# Patient Record
Sex: FEMALE | Race: WHITE | NOT HISPANIC OR LATINO | Employment: FULL TIME | URBAN - METROPOLITAN AREA
[De-identification: names, ages, dates, MRNs, and addresses within clinical notes are randomized per-mention and may not be internally consistent; named-entity substitution may affect disease eponyms.]

---

## 2017-03-10 ENCOUNTER — OFFICE VISIT (OUTPATIENT)
Dept: UROLOGY | Facility: CLINIC | Age: 37
End: 2017-03-10
Payer: COMMERCIAL

## 2017-03-10 VITALS — HEIGHT: 64 IN | WEIGHT: 139.75 LBS | RESPIRATION RATE: 18 BRPM | BODY MASS INDEX: 23.86 KG/M2

## 2017-03-10 DIAGNOSIS — N20.0 KIDNEY STONE: ICD-10-CM

## 2017-03-10 DIAGNOSIS — N30.10 IC (INTERSTITIAL CYSTITIS): Primary | ICD-10-CM

## 2017-03-10 DIAGNOSIS — G89.29 RIGHT FLANK PAIN, CHRONIC: ICD-10-CM

## 2017-03-10 DIAGNOSIS — R39.89 BLADDER PAIN: ICD-10-CM

## 2017-03-10 DIAGNOSIS — R31.29 HEMATURIA, MICROSCOPIC: ICD-10-CM

## 2017-03-10 DIAGNOSIS — R10.9 RIGHT FLANK PAIN, CHRONIC: ICD-10-CM

## 2017-03-10 PROCEDURE — 99999 PR PBB SHADOW E&M-EST. PATIENT-LVL III: CPT | Mod: PBBFAC,,, | Performed by: UROLOGY

## 2017-03-10 PROCEDURE — 99215 OFFICE O/P EST HI 40 MIN: CPT | Mod: 25,S$GLB,, | Performed by: UROLOGY

## 2017-03-10 PROCEDURE — 88112 CYTOPATH CELL ENHANCE TECH: CPT | Performed by: PATHOLOGY

## 2017-03-10 PROCEDURE — 88112 CYTOPATH CELL ENHANCE TECH: CPT | Mod: 26,,, | Performed by: PATHOLOGY

## 2017-03-10 PROCEDURE — 95970 ALYS NPGT W/O PRGRMG: CPT | Mod: S$GLB,,, | Performed by: UROLOGY

## 2017-03-10 PROCEDURE — 1160F RVW MEDS BY RX/DR IN RCRD: CPT | Mod: S$GLB,,, | Performed by: UROLOGY

## 2017-03-10 RX ORDER — POTASSIUM CITRATE 10 MEQ/1
10 TABLET, EXTENDED RELEASE ORAL
Qty: 270 TABLET | Refills: 3 | Status: SHIPPED | OUTPATIENT
Start: 2017-03-10 | End: 2018-04-13 | Stop reason: SDUPTHER

## 2017-03-10 RX ORDER — DOXEPIN HYDROCHLORIDE 10 MG/1
CAPSULE ORAL
Refills: 0 | COMMUNITY
Start: 2017-02-28 | End: 2018-04-13

## 2017-03-10 RX ORDER — SUMATRIPTAN SUCCINATE 100 MG/1
100 TABLET ORAL
COMMUNITY
Start: 2016-10-25 | End: 2021-02-17 | Stop reason: CLARIF

## 2017-03-10 RX ORDER — GABAPENTIN 100 MG/1
CAPSULE ORAL
Refills: 0 | COMMUNITY
Start: 2017-02-22 | End: 2020-03-03

## 2017-03-10 NOTE — PROGRESS NOTES
CC: s/p InterStim, IC, chronic right flank pain    Tracy Tucker is a 36 y.o. woman who is here for the evaluation of IC (has daily right flank pain when she has to urinate bad. currently being treated for nerve pain in [place])  hx of IC and kidney stone disease.     Since Urocit K was given by me with an intention of alkalinization of urine, she feels a lot better with less bladder pain.  She is very pleased.  No associated gross hematuria or change in urination noted.    Sometimes stress definitely results in the right flank pain.  Has been followed by Dr. Perez in GI.     s/p InterStim Therapy on 7/30/14 for incomplete bladder emptying, frequency and urgency, bladder pain.  She is dong well with voiding symptoms.  She now knows that what food or stress can result in her flare-ups.  Has been on Flomax and hydroxyzine, but was able to wean them off.  Has a question about getting a MRI while she has the InterStim Devices.    Past Medical History:   Diagnosis Date    PONV (postoperative nausea and vomiting)     S/P Botox injection 2014    urethral     Past Surgical History:   Procedure Laterality Date    CHOLECYSTECTOMY      TUBAL LIGATION       Social History   Substance Use Topics    Smoking status: Never Smoker    Smokeless tobacco: None    Alcohol use No     History reviewed. No pertinent family history.  Allergy:  Review of patient's allergies indicates:  No Known Allergies  Outpatient Encounter Prescriptions as of 3/10/2017   Medication Sig Dispense Refill    dextroamphetamine-amphetamine (ADDERALL XR) 20 MG 24 hr capsule Take 20 mg by mouth every morning.      lidocaine-prilocaine (EMLA) cream       omeprazole (PRILOSEC) 20 MG capsule Take 20 mg by mouth once daily.  10    ondansetron (ZOFRAN ODT) 4 MG TbDL Take 4 mg by mouth.      potassium citrate (UROCIT-K) 10 mEq (1,080 mg) TbSR Take 1 tablet (10 mEq total) by mouth 3 (three) times daily with meals. 270 tablet 3    sumatriptan  (IMITREX) 100 MG tablet Take 100 mg by mouth.      verapamil (VERELAN PM) 200 mg 24 hr capsule Take 200 mg by mouth.      [DISCONTINUED] potassium citrate (UROCIT-K) 10 mEq (1,080 mg) TbSR Take 1 tablet (10 mEq total) by mouth 3 (three) times daily with meals. 270 tablet 3    doxepin (SINEQUAN) 10 MG capsule   0    gabapentin (NEURONTIN) 100 MG capsule take 1 to 3 capsules by mouth at bedtime for NERVE PAIN  0    sumatriptan (IMITREX) 100 MG tablet Take 100 mg by mouth.      [DISCONTINUED] dextroamphetamine-amphetamine 10 mg Tab Take by mouth.      [DISCONTINUED] hydrOXYzine HCl (ATARAX) 25 MG tablet Take 1 tablet (25 mg total) by mouth every evening. 90 tablet 3    [DISCONTINUED] phenazopyridine (PYRIDIUM) 200 MG tablet Take 1 tablet (200 mg total) by mouth 3 (three) times daily as needed for Pain. 100 tablet 0    [DISCONTINUED] propranolol (INDERAL LA) 120 MG 24 hr capsule Take 120 mg by mouth once daily.      [DISCONTINUED] tamsulosin (FLOMAX) 0.4 mg Cp24 Take 1 capsule (0.4 mg total) by mouth once daily. 90 capsule 3    [DISCONTINUED] temazepam (RESTORIL) 30 mg capsule Take 30 mg by mouth.       No facility-administered encounter medications on file as of 3/10/2017.      Review of Systems   General ROS: GENERAL:  No weight gain or loss  SKIN:  No rashes or lacerations  HEAD:  No headaches  EYES:  No exophthalmos, jaundice or blindness  EARS:  No dizziness, tinnitus or hearing loss  NOSE:  No changes in smell  MOUTH & THROAT:  No dyskinetic movements or obvious goiter  CHEST:  No shortness of breath, hyperventilation or cough  CARDIOVASCULAR:  No tachycardia or chest pain  ABDOMEN:  No nausea, vomiting, pain, constipation or diarrhea  URINARY:  No frequency, dysuria or sexual dysfunction  ENDOCRINE:  No polydipsia, polyuria  MUSCULOSKELETAL:  No pain or stiffness of the joints  NEUROLOGIC:  No weakness, sensory changes, seizures, confusion, memory loss, tremor or other abnormal movements  Physical  Exam     Vitals:    03/10/17 0951   Resp: 18     Physical Examination:   General appearance - alert, well appearing, and in no distress  Mental status - alert, oriented to person, place, and time  Eyes - pupils equal and reactive, extraocular eye movements intact  Ears - bilateral TM's and external ear canals normal  Nose - normal and patent, no erythema, discharge or polyps  Mouth - mucous membranes moist, pharynx normal without lesions  Neck - supple, no significant adenopathy  Chest - clear to auscultation, no wheezes, rales or rhonchi, symmetric air entry  Breast- no mass or lumps or tenderness  Heart - normal rate, regular rhythm, normal S1, S2, no murmurs, rubs, clicks or gallops  Abdomen - soft, nontender, nondistended, no masses or organomegaly of liver and spleen, no hernia noted.    Back exam - full range of motion, no tenderness, palpable spasm or pain on motion  LE - No edema noted.  Neurological - alert, oriented, normal speech, no focal findings or movement disorder noted  Musculoskeletal - no joint tenderness, deformity or swelling    LABS:  Lab Results   Component Value Date    CREATININE 0.7 08/18/2015     No results found for: LABURIN  Radiology:  CT 12/19/16   Punctate bilateral nonobstructing renal stones.    Procedure  Interstim checked.  It was turned off.  I turned it back on and she felt the stimulation properly.  Volt 0.8 volt.  Battery life 28 to 47 %, 26 to 44 months left.    Assessment and Plan:  April was seen today for ic.    Diagnoses and all orders for this visit:    IC (interstitial cystitis)    Bladder pain  -     potassium citrate (UROCIT-K) 10 mEq (1,080 mg) TbSR; Take 1 tablet (10 mEq total) by mouth 3 (three) times daily with meals.    Kidney stone  -     potassium citrate (UROCIT-K) 10 mEq (1,080 mg) TbSR; Take 1 tablet (10 mEq total) by mouth 3 (three) times daily with meals.    Right flank pain, chronic    continue IC smart diet.  Doing well on Urocit K for her kidney stones  as well as bladder irritation and pian.  Alkalization of urine definitely helped her.  Usage of baking soda prn discussed.    Regarding MRI, if it is absolutely needed, I asked pt that her doctor can order it a the [facility], and it can be done without problems related to InterStim devices.  I simply recommend that she should turn the InterStim voltage down to zero and turn the generator off.  After MRI, she can turn it back on again.  Or she can arrange the MRI thru my office if necessary.  Potential risks of getting MRI discussed and she understands and will discuss this matter with her doctor(s), especially evaluating her chronic right side pain.    I spent 40 minutes with the patient of which more than half was spent in direct consultation with the patient in regards to our treatment and plan.    Follow-up:  Return in about 1 year (around 3/10/2018).

## 2017-03-10 NOTE — MR AVS SNAPSHOT
[facility] - Urology 4th Floor  [address]  [facility] [address]  Phone: [phone]                  April Helen Tucker   3/10/2017 9:40 AM   Office Visit    Description:  Female : 1980   Provider:  Torres Medrano MD   Department:  [facility] - Urology 4th Floor           Reason for Visit     IC           Diagnoses this Visit        Comments    IC (interstitial cystitis)    -  Primary     Bladder pain         Kidney stone         Right flank pain, chronic                To Do List           Goals (5 Years of Data)     None      Follow-Up and Disposition     Return in about 1 year (around 3/10/2018).       These Medications        Disp Refills Start End    potassium citrate (UROCIT-K) 10 mEq (1,080 mg) TbSR 270 tablet 3 3/10/2017 3/10/2018    Take 1 tablet (10 mEq total) by mouth 3 (three) times daily with meals. - Oral    Pharmacy: [facility] - [address] #: [phone]         [facility] On Call     [facility] On Call Nurse Care Line -  Assistance  Registered nurses in the [facility] provide clinical advisement, health education, appointment booking, and other advisory services.  Call for this free service at [phone].             Medications           Message regarding Medications     Verify the changes and/or additions to your medication regime listed below are the same as discussed with your clinician today.  If any of these changes or additions are incorrect, please notify your healthcare provider.        STOP taking these medications     dextroamphetamine-amphetamine 10 mg Tab Take by mouth.    hydrOXYzine HCl (ATARAX) 25 MG tablet Take 1 tablet (25 mg total) by mouth every evening.    phenazopyridine (PYRIDIUM) 200 MG tablet Take 1 tablet (200 mg total) by mouth 3 (three) times daily as needed for Pain.    tamsulosin (FLOMAX) 0.4 mg Cp24 Take 1 capsule (0.4 mg total) by mouth once daily.    temazepam (RESTORIL) 30 mg capsule  "Take 30 mg by mouth.    propranolol (INDERAL LA) 120 MG 24 hr capsule Take 120 mg by mouth once daily.           Verify that the below list of medications is an accurate representation of the medications you are currently taking.  If none reported, the list may be blank. If incorrect, please contact your healthcare provider. Carry this list with you in case of emergency.           Current Medications     dextroamphetamine-amphetamine (ADDERALL XR) 20 MG 24 hr capsule Take 20 mg by mouth every morning.    lidocaine-prilocaine (EMLA) cream     omeprazole (PRILOSEC) 20 MG capsule Take 20 mg by mouth once daily.    ondansetron (ZOFRAN ODT) 4 MG TbDL Take 4 mg by mouth.    potassium citrate (UROCIT-K) 10 mEq (1,080 mg) TbSR Take 1 tablet (10 mEq total) by mouth 3 (three) times daily with meals.    sumatriptan (IMITREX) 100 MG tablet Take 100 mg by mouth.    verapamil (VERELAN PM) 200 mg 24 hr capsule Take 200 mg by mouth.    doxepin (SINEQUAN) 10 MG capsule     gabapentin (NEURONTIN) 100 MG capsule take 1 to 3 capsules by mouth at bedtime for NERVE PAIN           Clinical Reference Information           Your Vitals Were     Resp Height Weight Last Period BMI    18 5' 4" (1.626 m) 63.4 kg (139 lb 12.4 oz) 06/19/2014 23.99 kg/m2      Allergies as of 3/10/2017     No Known Allergies      Immunizations Administered on Date of Encounter - 3/10/2017     None      Language Assistance Services     ATTENTION: Language assistance services are available, free of charge. Please call [phone].      ATENCIÓN: Si fabiola thompson, tiene a carter disposición servicios gratuitos de asistencia lingüística. Llame al [phone].     [facility] Ý: N?u b?n nói Ti?ng Vi?t, có các d?ch v? h? tr? ngôn ng? mi?n phí dành cho b?n. G?i s? [phone].         [facility] complies with applicable Federal civil rights laws and does not discriminate on the basis of race, color, national origin, age, disability, or sex.        "

## 2018-04-13 ENCOUNTER — OFFICE VISIT (OUTPATIENT)
Dept: UROLOGY | Facility: CLINIC | Age: 38
End: 2018-04-13
Payer: COMMERCIAL

## 2018-04-13 VITALS
HEART RATE: 100 BPM | SYSTOLIC BLOOD PRESSURE: 117 MMHG | BODY MASS INDEX: 24.84 KG/M2 | HEIGHT: 64 IN | DIASTOLIC BLOOD PRESSURE: 66 MMHG | WEIGHT: 145.5 LBS

## 2018-04-13 DIAGNOSIS — N20.0 KIDNEY STONE: ICD-10-CM

## 2018-04-13 DIAGNOSIS — R39.89 BLADDER PAIN: Primary | ICD-10-CM

## 2018-04-13 DIAGNOSIS — N30.10 IC (INTERSTITIAL CYSTITIS): ICD-10-CM

## 2018-04-13 PROCEDURE — 99999 PR PBB SHADOW E&M-EST. PATIENT-LVL III: CPT | Mod: PBBFAC,,, | Performed by: UROLOGY

## 2018-04-13 PROCEDURE — 99214 OFFICE O/P EST MOD 30 MIN: CPT | Mod: S$GLB,,, | Performed by: UROLOGY

## 2018-04-13 RX ORDER — POTASSIUM CITRATE 10 MEQ/1
10 TABLET, EXTENDED RELEASE ORAL
Qty: 270 TABLET | Refills: 3 | Status: SHIPPED | OUTPATIENT
Start: 2018-04-13 | End: 2019-04-06 | Stop reason: SDUPTHER

## 2018-04-13 RX ORDER — LISDEXAMFETAMINE DIMESYLATE 40 MG/1
CAPSULE ORAL
COMMUNITY
Start: 2018-04-11 | End: 2020-03-03

## 2018-04-13 NOTE — PROGRESS NOTES
CC: s/p InterStim, IC, chronic right flank pain    Tracy Tucker is a 38 y.o. woman who is here for the evaluation of ic(interstitial cystitis) (annual check up she not complaining about no urology issue doing well .)  hx of IC and kidney stone disease.  She is really doing well.  Was noted to have back problem and has been taking Neurotin which really helped her back pain.    In addition, she noticed that since she has taken Potassium citrate to alkalinize her urine, her bladder pain has been better controlled.  She only had a couple of flare up episodes for the past year.     Since Urocit K was given by me with an intention of alkalinization of urine, she feels a lot better with less bladder pain.  She is very pleased.  No associated gross hematuria or change in urination noted.    Sometimes stress definitely results in the right flank pain.  Has been followed by Dr. Perez in GI.     s/p InterStim Therapy on 7/30/14 for incomplete bladder emptying, frequency and urgency, bladder pain.  She is dong well with voiding symptoms.  She now knows that what food or stress can result in her flare-ups.  Has been on Flomax and hydroxyzine, but was able to wean them off.  Has a question about getting a MRI while she has the InterStim Devices.    Past Medical History:   Diagnosis Date    PONV (postoperative nausea and vomiting)     S/P Botox injection 2014    urethral     Past Surgical History:   Procedure Laterality Date    CHOLECYSTECTOMY      TUBAL LIGATION       Social History   Substance Use Topics    Smoking status: Never Smoker    Smokeless tobacco: Not on file    Alcohol use No     No family history on file.  Allergy:  Review of patient's allergies indicates:  No Known Allergies  Outpatient Encounter Prescriptions as of 4/13/2018   Medication Sig Dispense Refill    gabapentin (NEURONTIN) 100 MG capsule take 1 to 3 capsules by mouth at bedtime for NERVE PAIN  0    lidocaine-prilocaine (EMLA) cream        omeprazole (PRILOSEC) 20 MG capsule Take 20 mg by mouth once daily.  10    potassium citrate (UROCIT-K) 10 mEq (1,080 mg) TbSR Take 1 tablet (10 mEq total) by mouth 3 (three) times daily with meals. 270 tablet 3    sumatriptan (IMITREX) 100 MG tablet Take 100 mg by mouth.      VYVANSE 40 mg Cap       [DISCONTINUED] dextroamphetamine-amphetamine (ADDERALL XR) 20 MG 24 hr capsule Take 20 mg by mouth every morning.      [DISCONTINUED] doxepin (SINEQUAN) 10 MG capsule   0    [DISCONTINUED] potassium citrate (UROCIT-K) 10 mEq (1,080 mg) TbSR Take 1 tablet (10 mEq total) by mouth 3 (three) times daily with meals. 270 tablet 3     No facility-administered encounter medications on file as of 4/13/2018.      Review of Systems   General ROS: GENERAL:  No weight gain or loss  SKIN:  No rashes or lacerations  HEAD:  No headaches  EYES:  No exophthalmos, jaundice or blindness  EARS:  No dizziness, tinnitus or hearing loss  NOSE:  No changes in smell  MOUTH & THROAT:  No dyskinetic movements or obvious goiter  CHEST:  No shortness of breath, hyperventilation or cough  CARDIOVASCULAR:  No tachycardia or chest pain  ABDOMEN:  No nausea, vomiting, pain, constipation or diarrhea  URINARY:  No frequency, dysuria or sexual dysfunction  ENDOCRINE:  No polydipsia, polyuria  MUSCULOSKELETAL:  No pain or stiffness of the joints  NEUROLOGIC:  No weakness, sensory changes, seizures, confusion, memory loss, tremor or other abnormal movements  Physical Exam     Vitals:    04/13/18 0932   BP: 117/66   Pulse: 100     Physical Examination:   General appearance - alert, well appearing, and in no distress  Mental status - alert, oriented to person, place, and time  Eyes - pupils equal and reactive, extraocular eye movements intact  Ears - bilateral TM's and external ear canals normal  Nose - normal and patent, no erythema, discharge or polyps  Mouth - mucous membranes moist, pharynx normal without lesions  Neck - supple, no significant  adenopathy  Chest - clear to auscultation, no wheezes, rales or rhonchi, symmetric air entry  Breast- no mass or lumps or tenderness  Heart - normal rate, regular rhythm, normal S1, S2, no murmurs, rubs, clicks or gallops  Abdomen - soft, nontender, nondistended, no masses or organomegaly of liver and spleen, no hernia noted.    Back exam - full range of motion, no tenderness, palpable spasm or pain on motion  LE - No edema noted.  Neurological - alert, oriented, normal speech, no focal findings or movement disorder noted  Musculoskeletal - no joint tenderness, deformity or swelling    LABS:  Lab Results   Component Value Date    CREATININE 0.7 08/18/2015     No results found for: LABURIN  Radiology:  CT 12/19/16   Punctate bilateral nonobstructing renal stones.    Procedure  Interstim checked.  It was turned off.  I turned it back on and she felt the stimulation properly.  Volt 0.8 volt.  Battery life 28 to 47 %, 26 to 44 months left.    Assessment and Plan:  April was seen today for ic(interstitial cystitis).    Diagnoses and all orders for this visit:    Bladder pain  -     potassium citrate (UROCIT-K) 10 mEq (1,080 mg) TbSR; Take 1 tablet (10 mEq total) by mouth 3 (three) times daily with meals.    IC (interstitial cystitis)    Kidney stone  -     potassium citrate (UROCIT-K) 10 mEq (1,080 mg) TbSR; Take 1 tablet (10 mEq total) by mouth 3 (three) times daily with meals.    continue IC smart diet.  Doing well on Urocit K for her kidney stones as well as bladder irritation and pian.  Alkalization of urine definitely helped her.  Evamor or Alkaline water    Regarding MRI, I gave her the MRI guideline in pts with InterStim Therapy.  Potential risks of getting MRI discussed and she understands and will discuss this matter with her doctor(s), especially evaluating her chronic right side pain.    I spent 25 minutes with the patient of which more than half was spent in direct consultation with the patient in regards to  our treatment and plan.    Follow-up:  Follow-up in about 1 year (around 4/13/2019).

## 2018-08-22 ENCOUNTER — TELEPHONE (OUTPATIENT)
Dept: UROLOGY | Facility: CLINIC | Age: 38
End: 2018-08-22

## 2018-08-22 NOTE — TELEPHONE ENCOUNTER
----- Message from Sarai Lindsay LPN sent at 8/22/2018 12:35 PM CDT -----  Contact: pt#[phone]  Please advise    ----- Message -----  From: Shasta Rg  Sent: 8/22/2018  11:33 AM  To: [facility] Staff    Needs Advice    Reason for call:    Pt states that she's having a IC flare up and she wants to know if Dr Medrano would send in a Rx   Communication Preference:callback   Additional Information:[facility] #[phone]

## 2019-04-06 DIAGNOSIS — R39.89 BLADDER PAIN: ICD-10-CM

## 2019-04-06 DIAGNOSIS — N20.0 KIDNEY STONE: ICD-10-CM

## 2019-04-08 RX ORDER — POTASSIUM CITRATE 10 MEQ/1
TABLET, EXTENDED RELEASE ORAL
Qty: 300 TABLET | Refills: 3 | Status: SHIPPED | OUTPATIENT
Start: 2019-04-08 | End: 2020-03-03

## 2019-08-06 ENCOUNTER — OFFICE VISIT (OUTPATIENT)
Dept: UROLOGY | Facility: CLINIC | Age: 39
End: 2019-08-06
Payer: COMMERCIAL

## 2019-08-06 VITALS
SYSTOLIC BLOOD PRESSURE: 119 MMHG | HEART RATE: 83 BPM | WEIGHT: 141.13 LBS | HEIGHT: 64 IN | BODY MASS INDEX: 24.1 KG/M2 | DIASTOLIC BLOOD PRESSURE: 68 MMHG

## 2019-08-06 DIAGNOSIS — N20.0 KIDNEY STONES, CALCIUM OXALATE: Primary | ICD-10-CM

## 2019-08-06 PROCEDURE — 99215 OFFICE O/P EST HI 40 MIN: CPT | Mod: S$GLB,,, | Performed by: UROLOGY

## 2019-08-06 PROCEDURE — 99999 PR PBB SHADOW E&M-EST. PATIENT-LVL III: ICD-10-PCS | Mod: PBBFAC,,, | Performed by: UROLOGY

## 2019-08-06 PROCEDURE — 99999 PR PBB SHADOW E&M-EST. PATIENT-LVL III: CPT | Mod: PBBFAC,,, | Performed by: UROLOGY

## 2019-08-06 PROCEDURE — 99215 PR OFFICE/OUTPT VISIT, EST, LEVL V, 40-54 MIN: ICD-10-PCS | Mod: S$GLB,,, | Performed by: UROLOGY

## 2019-08-06 PROCEDURE — 3008F PR BODY MASS INDEX (BMI) DOCUMENTED: ICD-10-PCS | Mod: CPTII,S$GLB,, | Performed by: UROLOGY

## 2019-08-06 PROCEDURE — 3008F BODY MASS INDEX DOCD: CPT | Mod: CPTII,S$GLB,, | Performed by: UROLOGY

## 2019-08-06 RX ORDER — TAMSULOSIN HYDROCHLORIDE 0.4 MG/1
0.4 CAPSULE ORAL NIGHTLY
Qty: 30 CAPSULE | Refills: 1 | Status: SHIPPED | OUTPATIENT
Start: 2019-08-06 | End: 2020-03-03

## 2019-08-06 RX ORDER — LANOLIN ALCOHOL/MO/W.PET/CERES
400 CREAM (GRAM) TOPICAL DAILY
Qty: 90 TABLET | Refills: 3 | Status: SHIPPED | OUTPATIENT
Start: 2019-08-06 | End: 2019-08-29 | Stop reason: SDUPTHER

## 2019-08-06 RX ORDER — POTASSIUM CITRATE 10 MEQ/1
20 TABLET, EXTENDED RELEASE ORAL 2 TIMES DAILY WITH MEALS
Qty: 360 TABLET | Refills: 3 | Status: SHIPPED | OUTPATIENT
Start: 2019-08-06 | End: 2020-03-03 | Stop reason: SDUPTHER

## 2019-08-06 RX ORDER — TIZANIDINE 4 MG/1
4 TABLET ORAL NIGHTLY
COMMUNITY
End: 2020-03-03

## 2019-08-06 NOTE — PROGRESS NOTES
CC: left flank pain, hematuria, left UVJ stone    Tracy Tucker is a 39 y.o. woman who is here for the evaluation of Nephrolithiasis  went to the emergency department on 8/4/19 with complaints of having left flank pain consistent with prior kidney stone pain. Patient started couple hours ago and is gotten significantly worse. Patient is never required stents she usually can pass the stones on her own. She had some mild dysuria prior to arrival. Patient denies headache, neck pain, slurred speech, facial droop, chest pain, chest pressure, chest tightness, shortness of breath, fevers, chills night sweats, vomiting, diarrhea.    CT obtained outside ER on 8/4/19 showed Mild left hydronephrosis and left hydroureter with a 1 mm distal left ureteral stone Bilateral renal calculi with increased overall stones burden in the left kidney     She had a problem with persistent pain then complete resolved after her ER visit.  She did not collect the stone but she might have passed the stone.  No more hematuria noted.    hx of IC and kidney stone disease.  She is really doing well.  Was noted to have back problem and has been taking Neurotin which really helped her back pain.    In addition, she noticed that since she has taken Potassium citrate to alkalinize her urine, her bladder pain has been better controlled.  She only had a couple of flare up episodes for the past year.     Since Urocit K was given by me with an intention of alkalinization of urine, she feels a lot better with less bladder pain.  She is very pleased.  No associated gross hematuria or change in urination noted.    Sometimes stress definitely results in the right flank pain.  Has been followed by Dr. Perez in GI.     s/p InterStim Therapy on 7/30/14 for incomplete bladder emptying, frequency and urgency, bladder pain.  She is dong well with voiding symptoms.  She now knows that what food or stress can result in her flare-ups.  Has been on Flomax and  hydroxyzine, but was able to wean them off.  Has a question about getting a MRI while she has the InterStim Devices.    Past Medical History:   Diagnosis Date    PONV (postoperative nausea and vomiting)     S/P Botox injection 2014    urethral     Past Surgical History:   Procedure Laterality Date    CHOLECYSTECTOMY      IMPLANT-INTERSTIM-PERCUTANEOUS-I AND II/cpt codes 62387 and 00559 N/A 7/30/2014    Performed by Torres Medrano MD at [facility] OR [address] [place] [address]    TUBAL LIGATION       Social History     Tobacco Use    Smoking status: Never Smoker    Smokeless tobacco: Never Used   Substance Use Topics    Alcohol use: No    Drug use: No     No family history on file.  Allergy:  Review of patient's allergies indicates:  No Known Allergies  Outpatient Encounter Medications as of 8/6/2019   Medication Sig Dispense Refill    gabapentin (NEURONTIN) 100 MG capsule take 1 to 3 capsules by mouth at bedtime for NERVE PAIN  0    potassium citrate (UROCIT-K) 10 mEq (1,080 mg) TbSR TAKE 1 TABLET BY MOUTH 3  TIMES DAILY WITH MEALS 300 tablet 3    tiZANidine (ZANAFLEX) 4 MG tablet Take 4 mg by mouth nightly.      VYVANSE 40 mg Cap       lidocaine-prilocaine (EMLA) cream       magnesium oxide (MAG-OX) 400 mg (241.3 mg magnesium) tablet Take 1 tablet (400 mg total) by mouth once daily. 90 tablet 3    omeprazole (PRILOSEC) 20 MG capsule Take 20 mg by mouth once daily.  10    potassium citrate (UROCIT-K) 10 mEq (1,080 mg) TbSR Take 2 tablets (20 mEq total) by mouth 2 (two) times daily with meals. 360 tablet 3    sumatriptan (IMITREX) 100 MG tablet Take 100 mg by mouth.      tamsulosin (FLOMAX) 0.4 mg Cap Take 1 capsule (0.4 mg total) by mouth every evening. 30 capsule 1     No facility-administered encounter medications on file as of 8/6/2019.      Review of Systems   General ROS: GENERAL:  No weight gain or loss  SKIN:  No rashes or lacerations  HEAD:  No headaches  EYES:  No exophthalmos, jaundice or blindness  EARS:  No  dizziness, tinnitus or hearing loss  NOSE:  No changes in smell  MOUTH & THROAT:  No dyskinetic movements or obvious goiter  CHEST:  No shortness of breath, hyperventilation or cough  CARDIOVASCULAR:  No tachycardia or chest pain  ABDOMEN:  No nausea, vomiting, pain, constipation or diarrhea  URINARY:  No frequency, dysuria or sexual dysfunction  ENDOCRINE:  No polydipsia, polyuria  MUSCULOSKELETAL:  No pain or stiffness of the joints  NEUROLOGIC:  No weakness, sensory changes, seizures, confusion, memory loss, tremor or other abnormal movements  Physical Exam     Vitals:    08/06/19 1400   BP: 119/68   Pulse: 83     Physical Examination:   General appearance - alert, well appearing, and in no distress  Mental status - alert, oriented to person, place, and time  Eyes - pupils equal and reactive, extraocular eye movements intact  Ears - bilateral TM's and external ear canals normal  Nose - normal and patent, no erythema, discharge or polyps  Mouth - mucous membranes moist, pharynx normal without lesions  Neck - supple, no significant adenopathy  Chest - clear to auscultation, no wheezes, rales or rhonchi, symmetric air entry  Breast- no mass or lumps or tenderness  Heart - normal rate, regular rhythm, normal S1, S2, no murmurs, rubs, clicks or gallops  Abdomen - soft, nontender, nondistended, no masses or organomegaly of liver and spleen, no hernia noted.    Back exam - full range of motion, no tenderness, palpable spasm or pain on motion  LE - No edema noted.  Neurological - alert, oriented, normal speech, no focal findings or movement disorder noted  Musculoskeletal - no joint tenderness, deformity or swelling    LABS:  Lab Results   Component Value Date    CREATININE 0.7 08/18/2015     No results found for: LABURIN  Radiology:  CT 12/19/16   Punctate bilateral nonobstructing renal stones.    Ct Stone Protocol Abdomen And Pelvis    Result Date: 8/4/2019  Mild left hydronephrosis and left hydroureter with a 1 mm  distal left ureteral stone Bilateral renal calculi with increased overall stones burden in the left kidney Diverticulosis Enlarged left adnexal mass. Recommend further assessment with pelvic ultrasound.      Assessment and Plan:  April was seen today for nephrolithiasis.    Diagnoses and all orders for this visit:    Kidney stones, calcium oxalate  -     magnesium oxide (MAG-OX) 400 mg (241.3 mg magnesium) tablet; Take 1 tablet (400 mg total) by mouth once daily.  -     tamsulosin (FLOMAX) 0.4 mg Cap; Take 1 capsule (0.4 mg total) by mouth every evening.  -     X-Ray Abdomen AP 1 View; Future  -     CT Renal Stone Study ABD Pelvis WO; Future  -     Basic metabolic panel; Future    Other orders  -     potassium citrate (UROCIT-K) 10 mEq (1,080 mg) TbSR; Take 2 tablets (20 mEq total) by mouth 2 (two) times daily with meals.    I reviewed her CT reports.  Bilateral kidney stones burden is sill 1 to 2 mm in size.  Will maximize medical therapy.  Increase her Urocit K to 20 m Eq BID.  Magnesium oxide.  Low oxalate diet.  Will follow up with CT RSS, KUB and BMP in 6 months.  OK to use flomax prn or especially if she has a stone attack.    S/p InterStim Therapy  continue IC smart diet.  Alkalization of urine definitely helped her.  Evamor or Alkaline water  I spent 40 minutes with the patient of which more than half was spent in direct consultation with the patient in regards to our treatment and plan.    Follow-up:  Follow up in about 6 months (around 2/6/2020), or KUB, CT RSS, BMP.

## 2019-08-22 DIAGNOSIS — N20.0 KIDNEY STONES, CALCIUM OXALATE: ICD-10-CM

## 2019-08-22 RX ORDER — SYRING-NEEDL,DISP,INSUL,0.3 ML 29 G X1/2"
296 SYRINGE, EMPTY DISPOSABLE MISCELLANEOUS ONCE
Refills: 0 | COMMUNITY
Start: 2019-08-22 | End: 2019-08-22

## 2019-08-22 RX ORDER — LANOLIN ALCOHOL/MO/W.PET/CERES
400 CREAM (GRAM) TOPICAL DAILY
Qty: 90 TABLET | Refills: 3 | Status: CANCELLED | OUTPATIENT
Start: 2019-08-22

## 2019-08-22 NOTE — TELEPHONE ENCOUNTER
----- Message from Barbara Durand MA sent at 8/22/2019  9:51 AM CDT -----  Contact: [phone]  Needs Advice    Reason for call: pt states that she needs the magnesium citrate Rx re-sent to [facility] even though it's OTC it still has to go through them as a Rx from Dr Medrano        Communication Preference: [phone]    Additional Information:    [facility] - [address] [phone] (Phone)  [phone] (Fax)

## 2019-08-29 DIAGNOSIS — N20.0 KIDNEY STONES, CALCIUM OXALATE: ICD-10-CM

## 2019-08-29 RX ORDER — LANOLIN ALCOHOL/MO/W.PET/CERES
400 CREAM (GRAM) TOPICAL DAILY
Qty: 90 TABLET | Refills: 3 | Status: SHIPPED | OUTPATIENT
Start: 2019-08-29 | End: 2020-03-03 | Stop reason: SDUPTHER

## 2019-08-29 NOTE — TELEPHONE ENCOUNTER
----- Message from Nicolette Ray LPN sent at 8/29/2019 10:44 AM CDT -----  Contact: pt       ----- Message -----  From: Sachi Ramirez  Sent: 8/29/2019  10:10 AM  To: [facility] Staff    Type:  Needs Medical Advice    Who Called:  Pt called stated that [facility] faxed over a request for a Rx for pt magnesium oxide (MAG-OX) 400 mg (241.3 mg magnesium) tablet. Can you let pt know the status of the Rx. Or  Send [facility] a Rx so they can fill it.    Pharmacy name and phone #:    [facility] - [address]  [address] [place] [address]  Phone: [phone] Fax: [phone]    Would the patient rather a call back or a response via [facility]? Call pt at   Best Call Back Number: [phone]  Additional Information:

## 2020-03-03 ENCOUNTER — HOSPITAL ENCOUNTER (OUTPATIENT)
Dept: RADIOLOGY | Facility: HOSPITAL | Age: 40
Discharge: HOME | End: 2020-03-03
Attending: UROLOGY
Payer: COMMERCIAL

## 2020-03-03 ENCOUNTER — OFFICE VISIT (OUTPATIENT)
Dept: UROLOGY | Facility: CLINIC | Age: 40
End: 2020-03-03
Payer: COMMERCIAL

## 2020-03-03 VITALS
SYSTOLIC BLOOD PRESSURE: 115 MMHG | HEART RATE: 92 BPM | HEIGHT: 64 IN | DIASTOLIC BLOOD PRESSURE: 70 MMHG | BODY MASS INDEX: 25.25 KG/M2 | WEIGHT: 147.94 LBS

## 2020-03-03 DIAGNOSIS — N20.0 KIDNEY STONES, CALCIUM OXALATE: ICD-10-CM

## 2020-03-03 DIAGNOSIS — N30.10 IC (INTERSTITIAL CYSTITIS): ICD-10-CM

## 2020-03-03 DIAGNOSIS — N20.0 KIDNEY STONE: Primary | ICD-10-CM

## 2020-03-03 DIAGNOSIS — N20.0 KIDNEY STONE ON LEFT SIDE: ICD-10-CM

## 2020-03-03 DIAGNOSIS — N83.202 UNSPECIFIED OVARIAN CYST, LEFT SIDE: ICD-10-CM

## 2020-03-03 PROCEDURE — 74176 CT ABD & PELVIS W/O CONTRAST: CPT | Mod: 26,,, | Performed by: RADIOLOGY

## 2020-03-03 PROCEDURE — 74018 RADEX ABDOMEN 1 VIEW: CPT | Mod: TC

## 2020-03-03 PROCEDURE — 74176 CT RENAL STONE STUDY ABD PELVIS WO: ICD-10-PCS | Mod: 26,,, | Performed by: RADIOLOGY

## 2020-03-03 PROCEDURE — 3008F PR BODY MASS INDEX (BMI) DOCUMENTED: ICD-10-PCS | Mod: CPTII,S$GLB,, | Performed by: UROLOGY

## 2020-03-03 PROCEDURE — 74018 RADEX ABDOMEN 1 VIEW: CPT | Mod: 26,,, | Performed by: RADIOLOGY

## 2020-03-03 PROCEDURE — 3008F BODY MASS INDEX DOCD: CPT | Mod: CPTII,S$GLB,, | Performed by: UROLOGY

## 2020-03-03 PROCEDURE — 99999 PR PBB SHADOW E&M-EST. PATIENT-LVL III: ICD-10-PCS | Mod: PBBFAC,,, | Performed by: UROLOGY

## 2020-03-03 PROCEDURE — 99999 PR PBB SHADOW E&M-EST. PATIENT-LVL III: CPT | Mod: PBBFAC,,, | Performed by: UROLOGY

## 2020-03-03 PROCEDURE — 82365 CALCULUS SPECTROSCOPY: CPT

## 2020-03-03 PROCEDURE — 99215 OFFICE O/P EST HI 40 MIN: CPT | Mod: S$GLB,,, | Performed by: UROLOGY

## 2020-03-03 PROCEDURE — 74176 CT ABD & PELVIS W/O CONTRAST: CPT | Mod: TC

## 2020-03-03 PROCEDURE — 74018 XR ABDOMEN AP 1 VIEW: ICD-10-PCS | Mod: 26,,, | Performed by: RADIOLOGY

## 2020-03-03 PROCEDURE — 99215 PR OFFICE/OUTPT VISIT, EST, LEVL V, 40-54 MIN: ICD-10-PCS | Mod: S$GLB,,, | Performed by: UROLOGY

## 2020-03-03 RX ORDER — ESCITALOPRAM OXALATE 5 MG/1
TABLET ORAL
COMMUNITY
Start: 2019-12-13

## 2020-03-03 RX ORDER — ZOLPIDEM TARTRATE 5 MG/1
TABLET ORAL
COMMUNITY

## 2020-03-03 RX ORDER — POTASSIUM CITRATE 10 MEQ/1
20 TABLET, EXTENDED RELEASE ORAL 2 TIMES DAILY WITH MEALS
Qty: 360 TABLET | Refills: 3 | Status: SHIPPED | OUTPATIENT
Start: 2020-03-03 | End: 2021-02-08 | Stop reason: SDUPTHER

## 2020-03-03 RX ORDER — MELOXICAM 7.5 MG/1
TABLET ORAL
COMMUNITY
Start: 2019-12-09 | End: 2021-05-24

## 2020-03-03 RX ORDER — TAMSULOSIN HYDROCHLORIDE 0.4 MG/1
0.4 CAPSULE ORAL
COMMUNITY
Start: 2019-12-07 | End: 2021-02-08 | Stop reason: SDUPTHER

## 2020-03-03 RX ORDER — ORPHENADRINE CITRATE 100 MG/1
TABLET, EXTENDED RELEASE ORAL
COMMUNITY
Start: 2020-02-19 | End: 2021-02-17 | Stop reason: CLARIF

## 2020-03-03 RX ORDER — AMITRIPTYLINE HYDROCHLORIDE 10 MG/1
10 TABLET, FILM COATED ORAL NIGHTLY
Qty: 90 TABLET | Refills: 3 | Status: SHIPPED | OUTPATIENT
Start: 2020-03-03 | End: 2021-02-08

## 2020-03-03 RX ORDER — AMITRIPTYLINE HYDROCHLORIDE 10 MG/1
TABLET, FILM COATED ORAL
COMMUNITY
Start: 2019-12-09 | End: 2020-03-03 | Stop reason: SDUPTHER

## 2020-03-03 RX ORDER — LANOLIN ALCOHOL/MO/W.PET/CERES
400 CREAM (GRAM) TOPICAL DAILY
Qty: 90 TABLET | Refills: 3 | Status: SHIPPED | OUTPATIENT
Start: 2020-03-03 | End: 2021-02-08 | Stop reason: SDUPTHER

## 2020-03-03 NOTE — PROGRESS NOTES
CC: follow up left flank pain, hematuria, left UVJ stone    Tracy Tucker is a 39 y.o. woman who is here for the Follow-up    She is here today with CT RSS and KUB.  So far she no longer has a problem with left flank pain.  C/o occasional pressure over the bladder area.  Denies any hematuria or dysuria.  Has been on mag oxide and urocit K for kidney stone disease.  Was on flomax to facilitate stone passage.    went to the emergency department on 8/4/19 with complaints of having left flank pain consistent with prior kidney stone pain. Patient started couple hours ago and is gotten significantly worse. Patient is never required stents she usually can pass the stones on her own. She had some mild dysuria prior to arrival. Patient denies headache, neck pain, slurred speech, facial droop, chest pain, chest pressure, chest tightness, shortness of breath, fevers, chills night sweats, vomiting, diarrhea.  CT obtained outside ER on 8/4/19 showed Mild left hydronephrosis and left hydroureter with a 1 mm distal left ureteral stone Bilateral renal calculi with increased overall stones burden in the left kidney   She did not collect the stone but she might have passed the stone.  No more hematuria noted.    hx of IC and kidney stone disease.  She is really doing well.  Was noted to have back problem and has been taking Neurotin which really helped her back pain.    In addition, she noticed that since she has taken Potassium citrate to alkalinize her urine, her bladder pain has been better controlled.  She only had a couple of flare up episodes for the past year.     Since Urocit K was given by me with an intention of alkalinization of urine, she feels a lot better with less bladder pain.  She is very pleased.  No associated gross hematuria or change in urination noted.    Sometimes stress definitely results in the right flank pain.  Has been followed by Dr. Perez in GI.     s/p InterStim Therapy on 7/30/14 for incomplete  bladder emptying, frequency and urgency, bladder pain.  She is dong well with voiding symptoms.  She now knows that what food or stress can result in her flare-ups.  Has been on Flomax and hydroxyzine, but was able to wean them off.  Has a question about getting a MRI while she has the InterStim Devices.    Past Medical History:   Diagnosis Date    PONV (postoperative nausea and vomiting)     S/P Botox injection 2014    urethral     Past Surgical History:   Procedure Laterality Date    CHOLECYSTECTOMY      TUBAL LIGATION       Social History     Tobacco Use    Smoking status: Never Smoker    Smokeless tobacco: Never Used   Substance Use Topics    Alcohol use: No    Drug use: No     History reviewed. No pertinent family history.  Allergy:  Review of patient's allergies indicates:  No Known Allergies  Outpatient Encounter Medications as of 3/3/2020   Medication Sig Dispense Refill    amitriptyline (ELAVIL) 10 MG tablet Take 1 tablet (10 mg total) by mouth every evening. 90 tablet 3    escitalopram oxalate (LEXAPRO) 5 MG Tab       lidocaine-prilocaine (EMLA) cream       magnesium oxide (MAG-OX) 400 mg (241.3 mg magnesium) tablet Take 1 tablet (400 mg total) by mouth once daily. 90 tablet 3    meloxicam (MOBIC) 7.5 MG tablet       orphenadrine (NORFLEX) 100 mg tablet       potassium citrate (UROCIT-K) 10 mEq (1,080 mg) TbSR Take 2 tablets (20 mEq total) by mouth 2 (two) times daily with meals. 360 tablet 3    tamsulosin (FLOMAX) 0.4 mg Cap Take 0.4 mg by mouth.      zolpidem (AMBIEN) 5 MG Tab       [DISCONTINUED] amitriptyline (ELAVIL) 10 MG tablet       [DISCONTINUED] magnesium oxide (MAG-OX) 400 mg (241.3 mg magnesium) tablet Take 1 tablet (400 mg total) by mouth once daily. 90 tablet 3    [DISCONTINUED] potassium citrate (UROCIT-K) 10 mEq (1,080 mg) TbSR Take 2 tablets (20 mEq total) by mouth 2 (two) times daily with meals. 360 tablet 3    sumatriptan (IMITREX) 100 MG tablet Take 100 mg by mouth.       [DISCONTINUED] gabapentin (NEURONTIN) 100 MG capsule take 1 to 3 capsules by mouth at bedtime for NERVE PAIN  0    [DISCONTINUED] omeprazole (PRILOSEC) 20 MG capsule Take 20 mg by mouth once daily.  10    [DISCONTINUED] potassium citrate (UROCIT-K) 10 mEq (1,080 mg) TbSR TAKE 1 TABLET BY MOUTH 3  TIMES DAILY WITH MEALS 300 tablet 3    [DISCONTINUED] tamsulosin (FLOMAX) 0.4 mg Cap Take 1 capsule (0.4 mg total) by mouth every evening. 30 capsule 1    [DISCONTINUED] tiZANidine (ZANAFLEX) 4 MG tablet Take 4 mg by mouth nightly.      [DISCONTINUED] VYVANSE 40 mg Cap        No facility-administered encounter medications on file as of 3/3/2020.      Review of Systems   General ROS: GENERAL:  No weight gain or loss  SKIN:  No rashes or lacerations  HEAD:  No headaches  EYES:  No exophthalmos, jaundice or blindness  EARS:  No dizziness, tinnitus or hearing loss  NOSE:  No changes in smell  MOUTH & THROAT:  No dyskinetic movements or obvious goiter  CHEST:  No shortness of breath, hyperventilation or cough  CARDIOVASCULAR:  No tachycardia or chest pain  ABDOMEN:  No nausea, vomiting, pain, constipation or diarrhea  URINARY:  No frequency, dysuria or sexual dysfunction  ENDOCRINE:  No polydipsia, polyuria  MUSCULOSKELETAL:  No pain or stiffness of the joints  NEUROLOGIC:  No weakness, sensory changes, seizures, confusion, memory loss, tremor or other abnormal movements  Physical Exam     Vitals:    03/03/20 1300   BP: 115/70   Pulse: 92     Physical Examination:   General appearance - alert, well appearing, and in no distress  Mental status - alert, oriented to person, place, and time  Eyes - pupils equal and reactive, extraocular eye movements intact  Ears - bilateral TM's and external ear canals normal  Nose - normal and patent, no erythema, discharge or polyps  Mouth - mucous membranes moist, pharynx normal without lesions  Neck - supple, no significant adenopathy  Chest - clear to auscultation, no wheezes, rales  or rhonchi, symmetric air entry  Breast- no mass or lumps or tenderness  Heart - normal rate, regular rhythm, normal S1, S2, no murmurs, rubs, clicks or gallops  Abdomen - soft, nontender, nondistended, no masses or organomegaly of liver and spleen, no hernia noted.    Back exam - full range of motion, no tenderness, palpable spasm or pain on motion  LE - No edema noted.  Neurological - alert, oriented, normal speech, no focal findings or movement disorder noted  Musculoskeletal - no joint tenderness, deformity or swelling    LABS:  Lab Results   Component Value Date    CREATININE 0.7 08/18/2015     No results found for: LABURIN  Radiology:  CT 12/19/16   Punctate bilateral nonobstructing renal stones.    CT on 3/3/20  Decreased stone burden relative to the prior exam of 12/19/2016, with only a solitary punctate nonobstructing stone in the left upper pole renal collecting system.  No evidence of obstructive uropathy.  Prior cholecystectomy and hysterectomy.  Prominent left ovary measuring up to 6.5 cm.  This likely relates to underlying follicles or cyst.  Clinical correlation advised with pelvic ultrasound if warranted.    Programming of InterStim therapy  0.75 volts  Battery life greater 28 months   Impendence check all good.    Assessment and Plan:  April was seen today for follow-up.    Diagnoses and all orders for this visit:    Kidney stone  -     Urinary Stone Analysis    IC (interstitial cystitis)  -     amitriptyline (ELAVIL) 10 MG tablet; Take 1 tablet (10 mg total) by mouth every evening.    Unspecified ovarian cyst, left side    Kidney stone on left side  -     potassium citrate (UROCIT-K) 10 mEq (1,080 mg) TbSR; Take 2 tablets (20 mEq total) by mouth 2 (two) times daily with meals.    Kidney stones, calcium oxalate  -     magnesium oxide (MAG-OX) 400 mg (241.3 mg magnesium) tablet; Take 1 tablet (400 mg total) by mouth once daily.    I reviewed her CT reports.  Bilateral kidney stones burden seen before  1 to 2 mm in size are reduced to only a single stone on the left upper pole collecting system.  continue medical therapy Urocit K to 20 m Eq BID and Magnesium oxide.  Low oxalate diet.    OK to use flomax prn or especially if she has a stone attack.    Recommend to follow up with her ob regarding left ovary cyst?.    S/p InterStim Therapy, more than 28 months battery life left.  continue IC smart diet.  Drink plenty of water.  I spent 40 minutes with the patient of which more than half was spent in direct consultation with the patient in regards to our treatment and plan.    Follow-up:  Follow up in about 1 year (around 3/3/2021).

## 2020-03-03 NOTE — LETTER
March 3, 2020      Montrell Harman MD  [address] [place] [address]           [facility] U[facility] 4th Floor  [address]  [facility] [address]  Phone: [phone]          Patient: Tracy Tucker   MR Number: [id number]   YOB: 1980   Date of Visit: 3/3/2020       Dear Dr. Montrell Harman:    Thank you for referring Tracy Tucker to me for evaluation. Attached you will find relevant portions of my assessment and plan of care.    If you have questions, please do not hesitate to call me. I look forward to following Tracy Tucker along with you.    Sincerely,    Torres Medrano MD    Enclosure  CC:  No Recipients    If you would like to receive this communication electronically, please contact [email or web address] or [phone] to request more information on [facility] [facility] Link access.    For providers and/or their staff who would like to refer a patient to [facility], please contact us through our one-stop-shop provider referral line, [facility], at [phone].    If you feel you have received this communication in error or would no longer like to receive these types of communications, please e-mail [email or web address]

## 2020-03-06 LAB
COMPN STONE: NORMAL
SPECIMEN SOURCE: NORMAL
STONE ANALYSIS IR-IMP: NORMAL

## 2020-11-13 DIAGNOSIS — M54.50 LOW BACK ACHE: Primary | ICD-10-CM

## 2020-12-09 ENCOUNTER — HOSPITAL ENCOUNTER (OUTPATIENT)
Dept: RADIOLOGY | Facility: HOSPITAL | Age: 40
Discharge: HOME | End: 2020-12-09
Attending: ORTHOPAEDIC SURGERY
Payer: COMMERCIAL

## 2020-12-09 DIAGNOSIS — M54.50 LOW BACK ACHE: ICD-10-CM

## 2020-12-10 ENCOUNTER — TELEPHONE (OUTPATIENT)
Dept: UROLOGY | Facility: CLINIC | Age: 40
End: 2020-12-10

## 2020-12-10 NOTE — TELEPHONE ENCOUNTER
Pt has had interstim since 2014  She was advised that she would need it removed or replaced with a new mri compatible device. Pt will think about her options and call back on monday

## 2020-12-10 NOTE — TELEPHONE ENCOUNTER
----- Message from Seema Garcia LPN sent at 12/9/2020 10:42 AM CST -----  Pt showed in lobby for someone to help her turn off interstim for MRI of spine (not MRI compatible). Did not have her I-con remote and was told it was not compatible for latest MRI machine. States she called a week ago to find out if she could have MRI as previously told by Dr. Medrano. Would like a call tomorrow when you return to discuss as she came 2 hours away to have MRI done.

## 2021-02-08 ENCOUNTER — HOSPITAL ENCOUNTER (OUTPATIENT)
Dept: RADIOLOGY | Facility: HOSPITAL | Age: 41
Discharge: HOME | End: 2021-02-08
Attending: UROLOGY
Payer: COMMERCIAL

## 2021-02-08 ENCOUNTER — OFFICE VISIT (OUTPATIENT)
Dept: UROLOGY | Facility: CLINIC | Age: 41
End: 2021-02-08
Payer: COMMERCIAL

## 2021-02-08 VITALS
HEIGHT: 64 IN | SYSTOLIC BLOOD PRESSURE: 131 MMHG | DIASTOLIC BLOOD PRESSURE: 77 MMHG | WEIGHT: 163.81 LBS | HEART RATE: 116 BPM | BODY MASS INDEX: 27.96 KG/M2

## 2021-02-08 DIAGNOSIS — N20.0 KIDNEY STONE: ICD-10-CM

## 2021-02-08 DIAGNOSIS — N31.8 FREQUENCY-URGENCY SYNDROME: ICD-10-CM

## 2021-02-08 DIAGNOSIS — R33.9 INCOMPLETE BLADDER EMPTYING: ICD-10-CM

## 2021-02-08 DIAGNOSIS — T85.113A MECHANICAL BREAKDOWN OF GENERATOR OF IMPLANTED ELECTRONIC NEUROSTIMULATOR, INITIAL ENCOUNTER: ICD-10-CM

## 2021-02-08 DIAGNOSIS — N20.0 KIDNEY STONES, CALCIUM OXALATE: Primary | ICD-10-CM

## 2021-02-08 DIAGNOSIS — N20.0 KIDNEY STONE ON LEFT SIDE: ICD-10-CM

## 2021-02-08 DIAGNOSIS — R10.9 RIGHT FLANK PAIN: ICD-10-CM

## 2021-02-08 DIAGNOSIS — Z53.09 MRI CONTRAINDICATED DUE TO METAL IMPLANT: ICD-10-CM

## 2021-02-08 PROCEDURE — 74018 RADEX ABDOMEN 1 VIEW: CPT | Mod: TC,PO

## 2021-02-08 PROCEDURE — 3008F BODY MASS INDEX DOCD: CPT | Mod: CPTII,S$GLB,, | Performed by: UROLOGY

## 2021-02-08 PROCEDURE — 1126F PR PAIN SEVERITY QUANTIFIED, NO PAIN PRESENT: ICD-10-PCS | Mod: S$GLB,,, | Performed by: UROLOGY

## 2021-02-08 PROCEDURE — 95970 ALYS NPGT W/O PRGRMG: CPT | Mod: S$GLB,,, | Performed by: UROLOGY

## 2021-02-08 PROCEDURE — 99999 PR PBB SHADOW E&M-EST. PATIENT-LVL IV: CPT | Mod: PBBFAC,,, | Performed by: UROLOGY

## 2021-02-08 PROCEDURE — 1126F AMNT PAIN NOTED NONE PRSNT: CPT | Mod: S$GLB,,, | Performed by: UROLOGY

## 2021-02-08 PROCEDURE — 99215 PR OFFICE/OUTPT VISIT, EST, LEVL V, 40-54 MIN: ICD-10-PCS | Mod: 25,57,S$GLB, | Performed by: UROLOGY

## 2021-02-08 PROCEDURE — 99215 OFFICE O/P EST HI 40 MIN: CPT | Mod: 25,57,S$GLB, | Performed by: UROLOGY

## 2021-02-08 PROCEDURE — 3008F PR BODY MASS INDEX (BMI) DOCUMENTED: ICD-10-PCS | Mod: CPTII,S$GLB,, | Performed by: UROLOGY

## 2021-02-08 PROCEDURE — 95970 PR ANALYZE NEUROSTIM,NO REPROG: ICD-10-PCS | Mod: S$GLB,,, | Performed by: UROLOGY

## 2021-02-08 PROCEDURE — 74018 XR ABDOMEN AP 1 VIEW: ICD-10-PCS | Mod: 26,,, | Performed by: RADIOLOGY

## 2021-02-08 PROCEDURE — 74018 RADEX ABDOMEN 1 VIEW: CPT | Mod: 26,,, | Performed by: RADIOLOGY

## 2021-02-08 PROCEDURE — 99999 PR PBB SHADOW E&M-EST. PATIENT-LVL IV: ICD-10-PCS | Mod: PBBFAC,,, | Performed by: UROLOGY

## 2021-02-08 RX ORDER — GABAPENTIN 300 MG/1
300 CAPSULE ORAL 3 TIMES DAILY
COMMUNITY
End: 2023-01-23

## 2021-02-08 RX ORDER — TAMSULOSIN HYDROCHLORIDE 0.4 MG/1
0.4 CAPSULE ORAL DAILY
Qty: 90 CAPSULE | Refills: 3 | Status: SHIPPED | OUTPATIENT
Start: 2021-02-08 | End: 2021-02-26 | Stop reason: SDUPTHER

## 2021-02-08 RX ORDER — LANOLIN ALCOHOL/MO/W.PET/CERES
400 CREAM (GRAM) TOPICAL DAILY
Qty: 90 TABLET | Refills: 3 | Status: SHIPPED | OUTPATIENT
Start: 2021-02-08

## 2021-02-08 RX ORDER — POTASSIUM CITRATE 10 MEQ/1
20 TABLET, EXTENDED RELEASE ORAL 2 TIMES DAILY WITH MEALS
Qty: 360 TABLET | Refills: 3 | Status: SHIPPED | OUTPATIENT
Start: 2021-02-08 | End: 2021-02-26 | Stop reason: SDUPTHER

## 2021-02-08 RX ORDER — DEXTROAMPHETAMINE SULFATE, DEXTROAMPHETAMINE SACCHARATE, AMPHETAMINE SULFATE AND AMPHETAMINE ASPARTATE 5; 5; 5; 5 MG/1; MG/1; MG/1; MG/1
CAPSULE, EXTENDED RELEASE ORAL
COMMUNITY
Start: 2020-12-10 | End: 2021-02-17 | Stop reason: CLARIF

## 2021-02-09 ENCOUNTER — TELEPHONE (OUTPATIENT)
Dept: UROLOGY | Facility: CLINIC | Age: 41
End: 2021-02-09

## 2021-02-09 DIAGNOSIS — R33.9 INCOMPLETE BLADDER EMPTYING: ICD-10-CM

## 2021-02-09 DIAGNOSIS — N32.81 OAB (OVERACTIVE BLADDER): ICD-10-CM

## 2021-02-09 DIAGNOSIS — Z53.09 MRI CONTRAINDICATED DUE TO METAL IMPLANT: Primary | ICD-10-CM

## 2021-02-17 RX ORDER — DEXTROAMPHETAMINE SACCHARATE, AMPHETAMINE ASPARTATE, DEXTROAMPHETAMINE SULFATE AND AMPHETAMINE SULFATE 5; 5; 5; 5 MG/1; MG/1; MG/1; MG/1
1 TABLET ORAL DAILY
COMMUNITY

## 2021-02-21 ENCOUNTER — LAB VISIT (OUTPATIENT)
Dept: URGENT CARE | Facility: CLINIC | Age: 41
End: 2021-02-21
Payer: COMMERCIAL

## 2021-02-21 VITALS
DIASTOLIC BLOOD PRESSURE: 69 MMHG | WEIGHT: 163 LBS | SYSTOLIC BLOOD PRESSURE: 101 MMHG | TEMPERATURE: 99 F | HEART RATE: 76 BPM | HEIGHT: 64 IN | RESPIRATION RATE: 18 BRPM | BODY MASS INDEX: 27.83 KG/M2 | OXYGEN SATURATION: 99 %

## 2021-02-21 DIAGNOSIS — Z11.52 ENCOUNTER FOR SCREENING FOR COVID-19: Primary | ICD-10-CM

## 2021-02-21 LAB — SARS-COV-2 AG RESP QL IA.RAPID: NEGATIVE

## 2021-02-21 PROCEDURE — 87426 SARSCOV CORONAVIRUS AG IA: CPT | Mod: QW,S$GLB,, | Performed by: NURSE PRACTITIONER

## 2021-02-21 PROCEDURE — 99203 OFFICE O/P NEW LOW 30 MIN: CPT | Mod: S$GLB,,, | Performed by: EMERGENCY MEDICINE

## 2021-02-21 PROCEDURE — 99203 PR OFFICE/OUTPT VISIT, NEW, LEVL III, 30-44 MIN: ICD-10-PCS | Mod: S$GLB,,, | Performed by: EMERGENCY MEDICINE

## 2021-02-21 PROCEDURE — 87426 SARS CORONAVIRUS 2 ANTIGEN POCT: ICD-10-PCS | Mod: QW,S$GLB,, | Performed by: NURSE PRACTITIONER

## 2021-02-23 ENCOUNTER — TELEPHONE (OUTPATIENT)
Dept: UROLOGY | Facility: CLINIC | Age: 41
End: 2021-02-23

## 2021-02-24 ENCOUNTER — ANESTHESIA (OUTPATIENT)
Dept: SURGERY | Facility: HOSPITAL | Age: 41
End: 2021-02-24
Payer: COMMERCIAL

## 2021-02-24 ENCOUNTER — HOSPITAL ENCOUNTER (OUTPATIENT)
Facility: HOSPITAL | Age: 41
Discharge: HOME | End: 2021-02-24
Attending: UROLOGY | Admitting: UROLOGY
Payer: COMMERCIAL

## 2021-02-24 ENCOUNTER — ANESTHESIA EVENT (OUTPATIENT)
Dept: SURGERY | Facility: HOSPITAL | Age: 41
End: 2021-02-24
Payer: COMMERCIAL

## 2021-02-24 VITALS
TEMPERATURE: 99 F | BODY MASS INDEX: 27.83 KG/M2 | DIASTOLIC BLOOD PRESSURE: 47 MMHG | OXYGEN SATURATION: 100 % | WEIGHT: 163 LBS | HEIGHT: 64 IN | SYSTOLIC BLOOD PRESSURE: 109 MMHG | RESPIRATION RATE: 15 BRPM | HEART RATE: 77 BPM

## 2021-02-24 DIAGNOSIS — R39.15 URINARY URGENCY: Primary | ICD-10-CM

## 2021-02-24 DIAGNOSIS — N31.8 FREQUENCY-URGENCY SYNDROME: ICD-10-CM

## 2021-02-24 LAB — SARS-COV-2 RDRP RESP QL NAA+PROBE: NEGATIVE

## 2021-02-24 PROCEDURE — 37000008 HC ANESTHESIA 1ST 15 MINUTES: Performed by: UROLOGY

## 2021-02-24 PROCEDURE — 99900035 HC TECH TIME PER 15 MIN (STAT)

## 2021-02-24 PROCEDURE — 95971 ALYS SMPL SP/PN NPGT W/PRGRM: CPT | Mod: 59,,, | Performed by: UROLOGY

## 2021-02-24 PROCEDURE — D9220A PRA ANESTHESIA: ICD-10-PCS | Mod: ,,, | Performed by: ANESTHESIOLOGY

## 2021-02-24 PROCEDURE — C1897 LEAD, NEUROSTIM TEST KIT: HCPCS | Performed by: UROLOGY

## 2021-02-24 PROCEDURE — 36000706: Performed by: UROLOGY

## 2021-02-24 PROCEDURE — C1820 GENERATOR NEURO RECHG BAT SY: HCPCS | Performed by: UROLOGY

## 2021-02-24 PROCEDURE — 36000707: Performed by: UROLOGY

## 2021-02-24 PROCEDURE — D9220A PRA ANESTHESIA: ICD-10-PCS | Mod: ,,, | Performed by: NURSE ANESTHETIST, CERTIFIED REGISTERED

## 2021-02-24 PROCEDURE — 64590 PR IMPLANT PERIPH/GASTRIC NEUROSTIM/RECEIVER: ICD-10-PCS | Mod: 51,,, | Performed by: UROLOGY

## 2021-02-24 PROCEDURE — 25000003 PHARM REV CODE 250: Performed by: ANESTHESIOLOGY

## 2021-02-24 PROCEDURE — C1778 LEAD, NEUROSTIMULATOR: HCPCS | Performed by: UROLOGY

## 2021-02-24 PROCEDURE — 76000 FLUOROSCOPY <1 HR PHYS/QHP: CPT | Mod: 26,59,, | Performed by: UROLOGY

## 2021-02-24 PROCEDURE — 71000015 HC POSTOP RECOV 1ST HR: Performed by: UROLOGY

## 2021-02-24 PROCEDURE — 27000221 HC OXYGEN, UP TO 24 HOURS

## 2021-02-24 PROCEDURE — 71000033 HC RECOVERY, INTIAL HOUR: Performed by: UROLOGY

## 2021-02-24 PROCEDURE — 25000003 PHARM REV CODE 250: Performed by: STUDENT IN AN ORGANIZED HEALTH CARE EDUCATION/TRAINING PROGRAM

## 2021-02-24 PROCEDURE — 37000009 HC ANESTHESIA EA ADD 15 MINS: Performed by: UROLOGY

## 2021-02-24 PROCEDURE — 63600175 PHARM REV CODE 636 W HCPCS: Performed by: NURSE ANESTHETIST, CERTIFIED REGISTERED

## 2021-02-24 PROCEDURE — 95971 PR ANALYZE NEUROSTIM,SIMPLE/PROG: ICD-10-PCS | Mod: 59,,, | Performed by: UROLOGY

## 2021-02-24 PROCEDURE — 88300 PR  SURG PATH,GROSS,LEVEL I: ICD-10-PCS | Mod: 26,,, | Performed by: PATHOLOGY

## 2021-02-24 PROCEDURE — 88300 SURGICAL PATH GROSS: CPT | Mod: 26,,, | Performed by: PATHOLOGY

## 2021-02-24 PROCEDURE — 94761 N-INVAS EAR/PLS OXIMETRY MLT: CPT

## 2021-02-24 PROCEDURE — 63600175 PHARM REV CODE 636 W HCPCS: Performed by: STUDENT IN AN ORGANIZED HEALTH CARE EDUCATION/TRAINING PROGRAM

## 2021-02-24 PROCEDURE — D9220A PRA ANESTHESIA: Mod: ,,, | Performed by: ANESTHESIOLOGY

## 2021-02-24 PROCEDURE — 25000003 PHARM REV CODE 250: Performed by: UROLOGY

## 2021-02-24 PROCEDURE — U0002 COVID-19 LAB TEST NON-CDC: HCPCS

## 2021-02-24 PROCEDURE — 64581 OPN IMPLTJ NEA SACRAL NERVE: CPT | Mod: ,,, | Performed by: UROLOGY

## 2021-02-24 PROCEDURE — 25000003 PHARM REV CODE 250: Performed by: NURSE ANESTHETIST, CERTIFIED REGISTERED

## 2021-02-24 PROCEDURE — 88300 SURGICAL PATH GROSS: CPT | Performed by: PATHOLOGY

## 2021-02-24 PROCEDURE — 64581 PR IMPLANTATION, NEUROSTIM ELECT ARRAY, OPEN, SACRAL NERVE: ICD-10-PCS | Mod: ,,, | Performed by: UROLOGY

## 2021-02-24 PROCEDURE — 76000 PR  FLUOROSCOPE EXAMINATION: ICD-10-PCS | Mod: 26,59,, | Performed by: UROLOGY

## 2021-02-24 PROCEDURE — D9220A PRA ANESTHESIA: Mod: ,,, | Performed by: NURSE ANESTHETIST, CERTIFIED REGISTERED

## 2021-02-24 PROCEDURE — 64590 INS/RPL PRPH SAC/GSTR NPG/R: CPT | Mod: 51,,, | Performed by: UROLOGY

## 2021-02-24 DEVICE — KIT AXONICS LEAD TINED: Type: IMPLANTABLE DEVICE | Site: BACK | Status: FUNCTIONAL

## 2021-02-24 DEVICE — KIT AXONICS LEAD IMPLANT TINED: Type: IMPLANTABLE DEVICE | Site: BACK | Status: FUNCTIONAL

## 2021-02-24 DEVICE — NEUROSTIMULATOR AXONICS: Type: IMPLANTABLE DEVICE | Site: BACK | Status: FUNCTIONAL

## 2021-02-24 RX ORDER — SCOLOPAMINE TRANSDERMAL SYSTEM 1 MG/1
1 PATCH, EXTENDED RELEASE TRANSDERMAL ONCE
Status: DISCONTINUED | OUTPATIENT
Start: 2021-02-24 | End: 2021-02-24 | Stop reason: HOSPADM

## 2021-02-24 RX ORDER — LIDOCAINE HYDROCHLORIDE 10 MG/ML
1 INJECTION, SOLUTION EPIDURAL; INFILTRATION; INTRACAUDAL; PERINEURAL ONCE
Status: COMPLETED | OUTPATIENT
Start: 2021-02-24 | End: 2021-02-24

## 2021-02-24 RX ORDER — DOXYCYCLINE HYCLATE 100 MG
100 TABLET ORAL 2 TIMES DAILY
Qty: 10 TABLET | Refills: 0 | Status: SHIPPED | OUTPATIENT
Start: 2021-02-24 | End: 2021-03-01

## 2021-02-24 RX ORDER — LIDOCAINE HYDROCHLORIDE AND EPINEPHRINE 20; 10 MG/ML; UG/ML
INJECTION, SOLUTION INFILTRATION; PERINEURAL
Status: DISCONTINUED | OUTPATIENT
Start: 2021-02-24 | End: 2021-02-24 | Stop reason: HOSPADM

## 2021-02-24 RX ORDER — DEXMEDETOMIDINE HYDROCHLORIDE 100 UG/ML
INJECTION, SOLUTION INTRAVENOUS
Status: DISCONTINUED | OUTPATIENT
Start: 2021-02-24 | End: 2021-02-24

## 2021-02-24 RX ORDER — SODIUM CHLORIDE 0.9 % (FLUSH) 0.9 %
10 SYRINGE (ML) INJECTION
Status: DISCONTINUED | OUTPATIENT
Start: 2021-02-24 | End: 2021-02-24 | Stop reason: HOSPADM

## 2021-02-24 RX ORDER — ONDANSETRON 2 MG/ML
INJECTION INTRAMUSCULAR; INTRAVENOUS
Status: DISCONTINUED | OUTPATIENT
Start: 2021-02-24 | End: 2021-02-24

## 2021-02-24 RX ORDER — LIDOCAINE HYDROCHLORIDE 20 MG/ML
INJECTION INTRAVENOUS
Status: DISCONTINUED | OUTPATIENT
Start: 2021-02-24 | End: 2021-02-24

## 2021-02-24 RX ORDER — SODIUM BICARBONATE 1 MEQ/ML
SYRINGE (ML) INTRAVENOUS
Status: DISCONTINUED | OUTPATIENT
Start: 2021-02-24 | End: 2021-02-24 | Stop reason: HOSPADM

## 2021-02-24 RX ORDER — GENTAMICIN SULFATE 100 MG/100ML
240 INJECTION, SOLUTION INTRAVENOUS
Status: COMPLETED | OUTPATIENT
Start: 2021-02-24 | End: 2021-02-24

## 2021-02-24 RX ORDER — FENTANYL CITRATE 50 UG/ML
25 INJECTION, SOLUTION INTRAMUSCULAR; INTRAVENOUS EVERY 5 MIN PRN
Status: DISCONTINUED | OUTPATIENT
Start: 2021-02-24 | End: 2021-02-24 | Stop reason: HOSPADM

## 2021-02-24 RX ORDER — SODIUM CHLORIDE 9 MG/ML
INJECTION, SOLUTION INTRAVENOUS CONTINUOUS
Status: DISCONTINUED | OUTPATIENT
Start: 2021-02-24 | End: 2021-02-24 | Stop reason: HOSPADM

## 2021-02-24 RX ORDER — PROPOFOL 10 MG/ML
VIAL (ML) INTRAVENOUS
Status: DISCONTINUED | OUTPATIENT
Start: 2021-02-24 | End: 2021-02-24

## 2021-02-24 RX ORDER — VANCOMYCIN HCL IN 5 % DEXTROSE 1G/250ML
15 PLASTIC BAG, INJECTION (ML) INTRAVENOUS
Status: COMPLETED | OUTPATIENT
Start: 2021-02-24 | End: 2021-02-24

## 2021-02-24 RX ORDER — HYDROCODONE BITARTRATE AND ACETAMINOPHEN 5; 325 MG/1; MG/1
1 TABLET ORAL EVERY 6 HOURS PRN
Qty: 5 TABLET | Refills: 0 | Status: SHIPPED | OUTPATIENT
Start: 2021-02-24

## 2021-02-24 RX ORDER — FENTANYL CITRATE 50 UG/ML
INJECTION, SOLUTION INTRAMUSCULAR; INTRAVENOUS
Status: DISCONTINUED | OUTPATIENT
Start: 2021-02-24 | End: 2021-02-24

## 2021-02-24 RX ORDER — MIDAZOLAM HYDROCHLORIDE 1 MG/ML
INJECTION INTRAMUSCULAR; INTRAVENOUS
Status: DISCONTINUED | OUTPATIENT
Start: 2021-02-24 | End: 2021-02-24

## 2021-02-24 RX ORDER — PROPOFOL 10 MG/ML
VIAL (ML) INTRAVENOUS CONTINUOUS PRN
Status: DISCONTINUED | OUTPATIENT
Start: 2021-02-24 | End: 2021-02-24

## 2021-02-24 RX ORDER — SCOLOPAMINE TRANSDERMAL SYSTEM 1 MG/1
PATCH, EXTENDED RELEASE TRANSDERMAL
Status: DISCONTINUED | OUTPATIENT
Start: 2021-02-24 | End: 2021-02-24

## 2021-02-24 RX ADMIN — SODIUM CHLORIDE: 0.9 INJECTION, SOLUTION INTRAVENOUS at 10:02

## 2021-02-24 RX ADMIN — LIDOCAINE HYDROCHLORIDE 2 MG: 10 INJECTION, SOLUTION EPIDURAL; INFILTRATION; INTRACAUDAL at 10:02

## 2021-02-24 RX ADMIN — PROPOFOL 50 MG: 10 INJECTION, EMULSION INTRAVENOUS at 11:02

## 2021-02-24 RX ADMIN — SCOPALAMINE 1 PATCH: 1 PATCH, EXTENDED RELEASE TRANSDERMAL at 01:02

## 2021-02-24 RX ADMIN — MIDAZOLAM HYDROCHLORIDE 2 MG: 1 INJECTION, SOLUTION INTRAMUSCULAR; INTRAVENOUS at 11:02

## 2021-02-24 RX ADMIN — PROPOFOL 150 MCG/KG/MIN: 10 INJECTION, EMULSION INTRAVENOUS at 11:02

## 2021-02-24 RX ADMIN — DEXMEDETOMIDINE HYDROCHLORIDE 10 MCG: 100 INJECTION, SOLUTION, CONCENTRATE INTRAVENOUS at 11:02

## 2021-02-24 RX ADMIN — LIDOCAINE HYDROCHLORIDE 60 MG: 20 INJECTION, SOLUTION INTRAVENOUS at 11:02

## 2021-02-24 RX ADMIN — FENTANYL CITRATE 25 MCG: 50 INJECTION, SOLUTION INTRAMUSCULAR; INTRAVENOUS at 11:02

## 2021-02-24 RX ADMIN — GENTAMICIN SULFATE 240 MG: 40 INJECTION, SOLUTION INTRAMUSCULAR; INTRAVENOUS at 11:02

## 2021-02-24 RX ADMIN — ONDANSETRON 4 MG: 2 INJECTION, SOLUTION INTRAMUSCULAR; INTRAVENOUS at 11:02

## 2021-02-24 RX ADMIN — SCOPALAMINE 1 PATCH: 1 PATCH, EXTENDED RELEASE TRANSDERMAL at 11:02

## 2021-02-24 RX ADMIN — PROPOFOL 25 MG: 10 INJECTION, EMULSION INTRAVENOUS at 11:02

## 2021-02-24 RX ADMIN — FENTANYL CITRATE 25 MCG: 50 INJECTION, SOLUTION INTRAMUSCULAR; INTRAVENOUS at 12:02

## 2021-02-24 RX ADMIN — VANCOMYCIN HYDROCHLORIDE 1000 MG: 1 INJECTION, POWDER, LYOPHILIZED, FOR SOLUTION INTRAVENOUS at 10:02

## 2021-02-26 ENCOUNTER — PATIENT MESSAGE (OUTPATIENT)
Dept: UROLOGY | Facility: CLINIC | Age: 41
End: 2021-02-26

## 2021-02-26 DIAGNOSIS — R33.9 INCOMPLETE BLADDER EMPTYING: ICD-10-CM

## 2021-02-26 DIAGNOSIS — N20.0 KIDNEY STONE ON LEFT SIDE: ICD-10-CM

## 2021-02-26 RX ORDER — POTASSIUM CITRATE 10 MEQ/1
20 TABLET, EXTENDED RELEASE ORAL 2 TIMES DAILY WITH MEALS
Qty: 360 TABLET | Refills: 3 | Status: SHIPPED | OUTPATIENT
Start: 2021-02-26 | End: 2023-01-23 | Stop reason: SDUPTHER

## 2021-02-26 RX ORDER — TAMSULOSIN HYDROCHLORIDE 0.4 MG/1
0.4 CAPSULE ORAL DAILY
Qty: 90 CAPSULE | Refills: 3 | Status: SHIPPED | OUTPATIENT
Start: 2021-02-26 | End: 2023-01-23

## 2021-03-02 LAB
FINAL PATHOLOGIC DIAGNOSIS: NORMAL
GROSS: NORMAL
Lab: NORMAL

## 2021-05-24 ENCOUNTER — OFFICE VISIT (OUTPATIENT)
Dept: UROLOGY | Facility: CLINIC | Age: 41
End: 2021-05-24
Payer: COMMERCIAL

## 2021-05-24 VITALS
BODY MASS INDEX: 26.88 KG/M2 | WEIGHT: 157.44 LBS | HEART RATE: 97 BPM | DIASTOLIC BLOOD PRESSURE: 74 MMHG | HEIGHT: 64 IN | SYSTOLIC BLOOD PRESSURE: 126 MMHG

## 2021-05-24 DIAGNOSIS — R10.9 RIGHT FLANK PAIN: ICD-10-CM

## 2021-05-24 DIAGNOSIS — Z96.82 PRESENCE OF NEUROSTIMULATOR: ICD-10-CM

## 2021-05-24 DIAGNOSIS — N31.8 FREQUENCY-URGENCY SYNDROME: Primary | ICD-10-CM

## 2021-05-24 DIAGNOSIS — R39.15 URINARY URGENCY: ICD-10-CM

## 2021-05-24 PROBLEM — Z53.09 MRI CONTRAINDICATED DUE TO METAL IMPLANT: Status: RESOLVED | Noted: 2021-02-08 | Resolved: 2021-05-24

## 2021-05-24 PROCEDURE — 99024 PR POST-OP FOLLOW-UP VISIT: ICD-10-PCS | Mod: S$GLB,,, | Performed by: UROLOGY

## 2021-05-24 PROCEDURE — 99999 PR PBB SHADOW E&M-EST. PATIENT-LVL III: CPT | Mod: PBBFAC,,, | Performed by: UROLOGY

## 2021-05-24 PROCEDURE — 99999 PR PBB SHADOW E&M-EST. PATIENT-LVL III: ICD-10-PCS | Mod: PBBFAC,,, | Performed by: UROLOGY

## 2021-05-24 PROCEDURE — 1126F AMNT PAIN NOTED NONE PRSNT: CPT | Mod: S$GLB,,, | Performed by: UROLOGY

## 2021-05-24 PROCEDURE — 3008F PR BODY MASS INDEX (BMI) DOCUMENTED: ICD-10-PCS | Mod: CPTII,S$GLB,, | Performed by: UROLOGY

## 2021-05-24 PROCEDURE — 1126F PR PAIN SEVERITY QUANTIFIED, NO PAIN PRESENT: ICD-10-PCS | Mod: S$GLB,,, | Performed by: UROLOGY

## 2021-05-24 PROCEDURE — 3008F BODY MASS INDEX DOCD: CPT | Mod: CPTII,S$GLB,, | Performed by: UROLOGY

## 2021-05-24 PROCEDURE — 99024 POSTOP FOLLOW-UP VISIT: CPT | Mod: S$GLB,,, | Performed by: UROLOGY

## 2023-01-23 ENCOUNTER — OFFICE VISIT (OUTPATIENT)
Dept: UROLOGY | Facility: CLINIC | Age: 43
End: 2023-01-23
Payer: COMMERCIAL

## 2023-01-23 VITALS
HEIGHT: 64 IN | SYSTOLIC BLOOD PRESSURE: 129 MMHG | HEART RATE: 90 BPM | BODY MASS INDEX: 31.05 KG/M2 | DIASTOLIC BLOOD PRESSURE: 76 MMHG | WEIGHT: 181.88 LBS

## 2023-01-23 DIAGNOSIS — N20.0 KIDNEY STONE ON LEFT SIDE: ICD-10-CM

## 2023-01-23 DIAGNOSIS — N30.10 IC (INTERSTITIAL CYSTITIS): Primary | ICD-10-CM

## 2023-01-23 DIAGNOSIS — R39.89 BLADDER PAIN: ICD-10-CM

## 2023-01-23 DIAGNOSIS — N22 CALCULUS OF URINARY TRACT IN DISEASES CLASSIFIED ELSEWHERE: ICD-10-CM

## 2023-01-23 DIAGNOSIS — Z96.82 PRESENCE OF NEUROSTIMULATOR: ICD-10-CM

## 2023-01-23 DIAGNOSIS — M62.89 PELVIC FLOOR DYSFUNCTION IN FEMALE: ICD-10-CM

## 2023-01-23 PROCEDURE — 3078F DIAST BP <80 MM HG: CPT | Mod: CPTII,S$GLB,, | Performed by: UROLOGY

## 2023-01-23 PROCEDURE — 99999 PR PBB SHADOW E&M-EST. PATIENT-LVL III: ICD-10-PCS | Mod: PBBFAC,,, | Performed by: UROLOGY

## 2023-01-23 PROCEDURE — 3074F SYST BP LT 130 MM HG: CPT | Mod: CPTII,S$GLB,, | Performed by: UROLOGY

## 2023-01-23 PROCEDURE — 99215 OFFICE O/P EST HI 40 MIN: CPT | Mod: S$GLB,,, | Performed by: UROLOGY

## 2023-01-23 PROCEDURE — 3078F PR MOST RECENT DIASTOLIC BLOOD PRESSURE < 80 MM HG: ICD-10-PCS | Mod: CPTII,S$GLB,, | Performed by: UROLOGY

## 2023-01-23 PROCEDURE — 99215 PR OFFICE/OUTPT VISIT, EST, LEVL V, 40-54 MIN: ICD-10-PCS | Mod: S$GLB,,, | Performed by: UROLOGY

## 2023-01-23 PROCEDURE — 3008F PR BODY MASS INDEX (BMI) DOCUMENTED: ICD-10-PCS | Mod: CPTII,S$GLB,, | Performed by: UROLOGY

## 2023-01-23 PROCEDURE — 3074F PR MOST RECENT SYSTOLIC BLOOD PRESSURE < 130 MM HG: ICD-10-PCS | Mod: CPTII,S$GLB,, | Performed by: UROLOGY

## 2023-01-23 PROCEDURE — 3008F BODY MASS INDEX DOCD: CPT | Mod: CPTII,S$GLB,, | Performed by: UROLOGY

## 2023-01-23 PROCEDURE — 99999 PR PBB SHADOW E&M-EST. PATIENT-LVL III: CPT | Mod: PBBFAC,,, | Performed by: UROLOGY

## 2023-01-23 RX ORDER — CYCLOBENZAPRINE HYDROCHLORIDE 15 MG/1
15 CAPSULE, EXTENDED RELEASE ORAL DAILY PRN
COMMUNITY

## 2023-01-23 RX ORDER — POTASSIUM CITRATE 10 MEQ/1
20 TABLET, EXTENDED RELEASE ORAL 2 TIMES DAILY WITH MEALS
Qty: 360 TABLET | Refills: 3 | Status: SHIPPED | OUTPATIENT
Start: 2023-01-23

## 2023-01-23 NOTE — PROGRESS NOTES
CC: s/p Axonics sacral nerve neurostimulator implant, hx of kidney stones    Tracy Tucker is a 42 y.o. woman who is here for the follow up after sacral nerve neurostimulator implant.  Hx of urinary frequency, urinary urgency, and urgency incontinence who previously underwent InterStim implantation in 2014. She needs an MRI of her spine and is presenting for re-implantation of sacral neuromodulator lead and generator with an MRI-compatible model ([employer]ics).    Since her last visit, she developed difficulty of voiding, worsening pelvic pain.  Was diagnosed with pelvic floor dysfunction.  Underwent physical therapy near home in [place] and had a great response.  She even turned off Axonics therapy for a few months and did well.  She turned on the Axonics therapy again and has done well.    For hx of kidney stone, she has been taking Urocit K BID and magnesium suppliement.    Procedure: 2/24/21  Axonics Therapy ( rechargeable)  1. Removal and simultaneous incision for placement of new neurostimulator electrode array (47374)  2. Removal and simultaneous replacement of new implantable pulse generator (26896)  3. Complex analysis and programming of implanted neurostimulator pulse generator (15407)  4. Fluoroscopic guidance of needle (68314-29)  Specimen(s): InterStim lead and pulse generator  Findings:   Lead Placement: left  IPG Placement: right  Good sensory and motor function consistent with S3 stimulation seen     Since her surgery, she did really well.  She feels that the new device seems to work better than her old unit.  No problem reported for recharging.  She feels the stimulation in the perineal area.    Hx of kidney stone disease.    Has been on mag oxide and urocit K for kidney stone disease.  Was on flomax to facilitate stone passage.    C/o right flank pain.  Pt saw her neurosurgeon in her home town.  In order to further evaluate her pain, she needed a MRI of cervix, thorax, and lumbar spine.  However, she  could not get the MRI due to InterStim Therapy which is contra-indicated.  InterStim Therapy has been helping her bladder symptoms. She was able to stop many of her bladder meds since she has done well.    hx of IC and kidney stone disease.  She is really doing well.  Was noted to have back problem and has been taking Neurotin which really helped her back pain.    In addition, she noticed that since she has taken Potassium citrate to alkalinize her urine, her bladder pain has been better controlled.  She only   Since Urocit K was given by me with an intention of alkalinization of urine, she feels a lot better with less bladder pain.  She is very pleased.  No associated gross hematuria or change in urination noted.    Sometimes stress definitely results in the right flank pain.  Has been followed by Dr. Perez in GI.     s/p InterStim Therapy on 7/30/14 for incomplete bladder emptying, frequency and urgency, bladder pain.  She is dong well with voiding symptoms.  She now knows that what food or stress can result in her flare-ups.  Has been on Flomax and hydroxyzine, but was able to wean them off.  Has a question about getting a MRI while she has the InterStim Devices.    Past Medical History:   Diagnosis Date    PONV (postoperative nausea and vomiting)     S/P Botox injection 2014    urethral    TMJ (temporomandibular joint disorder)      Past Surgical History:   Procedure Laterality Date    CHOLECYSTECTOMY      FLUOROSCOPY  2/24/2021    Procedure: FLUOROSCOPY;  Surgeon: Torres Medrano MD;  Location: [facility] OR [address] [place] [address];  Service: Urology;;    INSERTION OF SACRAL NEUROSTIMULATOR GENERATOR Right 2/24/2021    Procedure: INSERTION, PULSE GENERATOR, NEUROSTIMULATOR, SACRAL;  Surgeon: Torres Medrano MD;  Location: [facility] OR [address] [place] [address];  Service: Urology;  Laterality: Right;    PERCUTANEOUS INSERTION OF SACRAL NERVE NEUROSTIMULATOR ELECTRODE LEAD Left 2/24/2021    Procedure: INSERTION, ELECTRODE LEAD, NEUROSTIMULATOR, SACRAL,  PERCUTANEOUS;  Surgeon: Torres Medrano MD;  Location: [facility] OR 2ND FLR;  Service: Urology;  Laterality: Left;    REMOVAL OF SACRAL NEUROSTIMULATOR DEVICE Right 2/24/2021    Procedure: REMOVAL, NEUROSTIMULATOR, SACRAL;  Surgeon: Torres Medrano MD;  Location: [facility] OR 2ND FLR;  Service: Urology;  Laterality: Right;    TUBAL LIGATION       Social History     Tobacco Use    Smoking status: Never    Smokeless tobacco: Never   Substance Use Topics    Alcohol use: No    Drug use: No     No family history on file.  Allergy:  Review of patient's allergies indicates:  No Known Allergies  Outpatient Encounter Medications as of 1/23/2023   Medication Sig Dispense Refill    cyclobenzaprine (AMRIX) 15 MG 24 hr capsule Take 15 mg by mouth daily as needed for Muscle spasms.      dextroamphetamine-amphetamine (ADDERALL) 20 mg tablet Take 1 tablet by mouth once daily. Takes in am /// if needs takes another after lunch -- usually used M-F      escitalopram oxalate (LEXAPRO) 5 MG Tab       lidocaine-prilocaine (EMLA) cream Pt is anxious regarding needle sticks // has passed out in past // uses EMLA when she knows she going to be stuck      [DISCONTINUED] potassium citrate (UROCIT-K) 10 mEq (1,080 mg) TbSR Take 2 tablets (20 mEq total) by mouth 2 (two) times daily with meals. 360 tablet 3    HYDROcodone-acetaminophen (NORCO) 5-325 mg per tablet Take 1 tablet by mouth every 6 (six) hours as needed for Pain. 5 tablet 0    magnesium oxide (MAG-OX) 400 mg (241.3 mg magnesium) tablet Take 1 tablet (400 mg total) by mouth once daily. 90 tablet 3    potassium citrate (UROCIT-K) 10 mEq (1,080 mg) TbSR Take 2 tablets (20 mEq total) by mouth 2 (two) times daily with meals. 360 tablet 3    zolpidem (AMBIEN) 5 MG Tab       [DISCONTINUED] gabapentin (NEURONTIN) 300 MG capsule 300 mg 3 (three) times daily.       [DISCONTINUED] tamsulosin (FLOMAX) 0.4 mg Cap Take 1 capsule (0.4 mg total) by mouth once daily. 90 capsule 3     No facility-administered  encounter medications on file as of 1/23/2023.     Review of Systems   General ROS: GENERAL:  No weight gain or loss  SKIN:  No rashes or lacerations  HEAD:  No headaches  EYES:  No exophthalmos, jaundice or blindness  EARS:  No dizziness, tinnitus or hearing loss  NOSE:  No changes in smell  MOUTH & THROAT:  No dyskinetic movements or obvious goiter  CHEST:  No shortness of breath, hyperventilation or cough  CARDIOVASCULAR:  No tachycardia or chest pain  ABDOMEN:  No nausea, vomiting, pain, constipation or diarrhea  URINARY:  No frequency, dysuria or sexual dysfunction  ENDOCRINE:  No polydipsia, polyuria  MUSCULOSKELETAL:  No pain or stiffness of the joints  NEUROLOGIC:  No weakness, sensory changes, seizures, confusion, memory loss, tremor or other abnormal movements  Physical Exam     Vitals:    01/23/23 1053   BP: 129/76   Pulse: 90       Physical Examination:   General appearance - alert, well appearing, and in no distress  Mental status - alert, oriented to person, place, and time  Eyes - pupils equal and reactive, extraocular eye movements intact  Ears - bilateral TM's and external ear canals normal  Nose - normal and patent, no erythema, discharge or polyps  Mouth - mucous membranes moist, pharynx normal without lesions  Neck - supple, no significant adenopathy  Chest - clear to auscultation, no wheezes, rales or rhonchi, symmetric air entry  Breast- no mass or lumps or tenderness  Heart - normal rate, regular rhythm, normal S1, S2, no murmurs, rubs, clicks or gallops  Abdomen - soft, nontender, nondistended, no masses or organomegaly of liver and spleen, no hernia noted.    Back exam - full range of motion, no tenderness, palpable spasm or pain on motion  LE - No edema noted.  Neurological - alert, oriented, normal speech, no focal findings or movement disorder noted  Musculoskeletal - no joint tenderness, deformity or swelling    LABS:  Lab Results   Component Value Date    CREATININE 0.7 02/08/2021     CREATININE 0.7 03/03/2020    CREATININE 0.7 08/18/2015     No results found for: LABURIN  Radiology:  CT 12/19/16   Punctate bilateral nonobstructing renal stones.    CT on 3/3/20  Decreased stone burden relative to the prior exam of 12/19/2016, with only a solitary punctate nonobstructing stone in the left upper pole renal collecting system.  No evidence of obstructive uropathy.  Prior cholecystectomy and hysterectomy.  Prominent left ovary measuring up to 6.5 cm.  This likely relates to underlying follicles or cyst.  Clinical correlation advised with pelvic ultrasound if warranted.    Procedure: checking InterStim therapy  0.9 volts  Battery life greater 14 months   Impendence check all good.    Assessment and Plan:  April was seen today for annual exam.    Diagnoses and all orders for this visit:    IC (interstitial cystitis)    Bladder pain    Presence of neurostimulator    Pelvic floor dysfunction in female    Kidney stone on left side  -     potassium citrate (UROCIT-K) 10 mEq (1,080 mg) TbSR; Take 2 tablets (20 mEq total) by mouth 2 (two) times daily with meals.    Calculus of urinary tract in diseases classified elsewhere  -     CT Renal Stone Study ABD Pelvis WO; Future  -     X-Ray Abdomen AP 1 View; Future  -     Basic Metabolic Panel; Future      Bilateral kidney stones burden seen before 1 to 2 mm in size are reduced to only a single stone on the left upper pole collecting system.  continue medical therapy Urocit K to 20 m Eq BID and Magnesium oxide.  Low oxalate diet.  Will get BMP, KUB and CT RSS in 1 year.      S/p Axonics Therapy with rechargeable generator on 2/2021  She is very pleased with the outcome.  It is MRI compatible and she will be able to use the same generator up to 15 years.    Hx of pelvic floor dysfunction.  Underwent physical therapy at home and experienced great response.  I explained the pelvic floor and urinary symptoms in depth.  Her daughter who is 16 years old, may also have a  similar problem. Recommend to see her doctor who recommended PT.    I spent 40 minutes with the patient of which more than half was spent in direct consultation with the patient in regards to our treatment and plan.    Follow-up:  Follow up in about 1 year (around 1/23/2024) for JUAN MANUEL, YOHANNES, CT RSS.

## 2024-07-12 ENCOUNTER — HOSPITAL ENCOUNTER (OUTPATIENT)
Dept: RADIOLOGY | Facility: CLINIC | Age: 44
Discharge: HOME | End: 2024-07-12
Attending: UROLOGY
Payer: COMMERCIAL

## 2024-07-12 DIAGNOSIS — N22 CALCULUS OF URINARY TRACT IN DISEASES CLASSIFIED ELSEWHERE: ICD-10-CM

## 2024-07-12 PROCEDURE — 74018 RADEX ABDOMEN 1 VIEW: CPT | Mod: TC,FY,PO

## 2024-07-12 PROCEDURE — 74018 RADEX ABDOMEN 1 VIEW: CPT | Mod: 26,,, | Performed by: STUDENT IN AN ORGANIZED HEALTH CARE EDUCATION/TRAINING PROGRAM

## 2024-10-21 ENCOUNTER — OFFICE VISIT (OUTPATIENT)
Dept: UROLOGY | Facility: CLINIC | Age: 44
End: 2024-10-21
Payer: COMMERCIAL

## 2024-10-21 VITALS
WEIGHT: 164.44 LBS | HEART RATE: 90 BPM | BODY MASS INDEX: 28.23 KG/M2 | SYSTOLIC BLOOD PRESSURE: 135 MMHG | DIASTOLIC BLOOD PRESSURE: 85 MMHG

## 2024-10-21 DIAGNOSIS — R39.15 URINARY URGENCY: ICD-10-CM

## 2024-10-21 DIAGNOSIS — M62.89 PELVIC FLOOR DYSFUNCTION IN FEMALE: Primary | ICD-10-CM

## 2024-10-21 PROCEDURE — 3075F SYST BP GE 130 - 139MM HG: CPT | Mod: CPTII,S$GLB,, | Performed by: UROLOGY

## 2024-10-21 PROCEDURE — 3008F BODY MASS INDEX DOCD: CPT | Mod: CPTII,S$GLB,, | Performed by: UROLOGY

## 2024-10-21 PROCEDURE — 3079F DIAST BP 80-89 MM HG: CPT | Mod: CPTII,S$GLB,, | Performed by: UROLOGY

## 2024-10-21 PROCEDURE — 99999 PR PBB SHADOW E&M-EST. PATIENT-LVL II: CPT | Mod: PBBFAC,,, | Performed by: UROLOGY

## 2024-10-21 PROCEDURE — 99214 OFFICE O/P EST MOD 30 MIN: CPT | Mod: S$GLB,,, | Performed by: UROLOGY

## 2024-10-21 RX ORDER — QUETIAPINE FUMARATE 100 MG/1
100 TABLET, FILM COATED ORAL DAILY
COMMUNITY

## 2024-10-21 NOTE — PROGRESS NOTES
CC: s/p Axonics sacral nerve neurostimulator implant, hx of kidney stones    Tracy Tucker is a 44 y.o. woman who is here for the follow up after sacral nerve neurostimulator implant.  Hx of urinary frequency, urinary urgency, and urgency incontinence who previously underwent InterStim implantation in 2014. She needs an MRI of her spine and is presenting for re-implantation of sacral neuromodulator lead and generator with an MRI-compatible model ([employer]onics).    Since her last visit, she developed difficulty of voiding, worsening pelvic pain.  Was diagnosed with pelvic floor dysfunction.  Underwent physical therapy near home in [place] and had a great response.  She even turned off Axonics therapy for a few months and did well.  She turned on the Axonics therapy again and has done well.    For hx of kidney stone, she has been taking Urocit K BID and magnesium suppliement.    Procedure: 2/24/21  Axonics Therapy ( rechargeable)  1. Removal and simultaneous incision for placement of new neurostimulator electrode array (02931)  2. Removal and simultaneous replacement of new implantable pulse generator (64145)  3. Complex analysis and programming of implanted neurostimulator pulse generator (77617)  4. Fluoroscopic guidance of needle (04796-11)  Specimen(s): InterStim lead and pulse generator  Findings:   Lead Placement: left  IPG Placement: right  Good sensory and motor function consistent with S3 stimulation seen     Since her surgery, she did really well.  She feels that the new device seems to work better than her old unit.  No problem reported for recharging.  She feels the stimulation in the perineal area.    Hx of kidney stone disease.    Has been on mag oxide and urocit K for kidney stone disease.  Was on flomax to facilitate stone passage.    C/o right flank pain.  Pt saw her neurosurgeon in her home town.  In order to further evaluate her pain, she needed a MRI of cervix, thorax, and lumbar spine.  However, she  could not get the MRI due to InterStim Therapy which is contra-indicated.  InterStim Therapy has been helping her bladder symptoms. She was able to stop many of her bladder meds since she has done well.    hx of IC and kidney stone disease.  She is really doing well.  Was noted to have back problem and has been taking Neurotin which really helped her back pain.    In addition, she noticed that since she has taken Potassium citrate to alkalinize her urine, her bladder pain has been better controlled.  She only   Since Urocit K was given by me with an intention of alkalinization of urine, she feels a lot better with less bladder pain.  She is very pleased.  No associated gross hematuria or change in urination noted.    Sometimes stress definitely results in the right flank pain.  Has been followed by Dr. Perez in GI.     s/p InterStim Therapy on 7/30/14 for incomplete bladder emptying, frequency and urgency, bladder pain.  She is dong well with voiding symptoms.  She now knows that what food or stress can result in her flare-ups.  Has been on Flomax and hydroxyzine, but was able to wean them off.  Has a question about getting a MRI while she has the InterStim Devices.    Past Medical History:   Diagnosis Date    PONV (postoperative nausea and vomiting)     S/P Botox injection 2014    urethral    TMJ (temporomandibular joint disorder)      Past Surgical History:   Procedure Laterality Date    CHOLECYSTECTOMY      FLUOROSCOPY  2/24/2021    Procedure: FLUOROSCOPY;  Surgeon: Torres Medrano MD;  Location: [facility] OR [address] [place] [address];  Service: Urology;;    INSERTION OF SACRAL NEUROSTIMULATOR GENERATOR Right 2/24/2021    Procedure: INSERTION, PULSE GENERATOR, NEUROSTIMULATOR, SACRAL;  Surgeon: Torres Medrano MD;  Location: [facility] OR [address] [place] [address];  Service: Urology;  Laterality: Right;    PERCUTANEOUS INSERTION OF SACRAL NERVE NEUROSTIMULATOR ELECTRODE LEAD Left 2/24/2021    Procedure: INSERTION, ELECTRODE LEAD, NEUROSTIMULATOR, SACRAL,  PERCUTANEOUS;  Surgeon: Torres Medrano MD;  Location: [facility] OR 2ND FLR;  Service: Urology;  Laterality: Left;    REMOVAL OF SACRAL NEUROSTIMULATOR DEVICE Right 2/24/2021    Procedure: REMOVAL, NEUROSTIMULATOR, SACRAL;  Surgeon: Torres Medrano MD;  Location: [facility] OR 2ND FLR;  Service: Urology;  Laterality: Right;    TUBAL LIGATION       Social History     Tobacco Use    Smoking status: Never    Smokeless tobacco: Never   Substance Use Topics    Alcohol use: No    Drug use: No     No family history on file.  Allergy:  Review of patient's allergies indicates:  No Known Allergies  Outpatient Encounter Medications as of 10/21/2024   Medication Sig Dispense Refill    cyclobenzaprine (AMRIX) 15 MG 24 hr capsule Take 15 mg by mouth daily as needed for Muscle spasms.      dextroamphetamine-amphetamine (ADDERALL) 20 mg tablet Take 1 tablet by mouth once daily. Takes in am /// if needs takes another after lunch -- usually used M-F      escitalopram oxalate (LEXAPRO) 5 MG Tab       HYDROcodone-acetaminophen (NORCO) 5-325 mg per tablet Take 1 tablet by mouth every 6 (six) hours as needed for Pain. 5 tablet 0    lidocaine-prilocaine (EMLA) cream Pt is anxious regarding needle sticks // has passed out in past // uses EMLA when she knows she going to be stuck      magnesium oxide (MAG-OX) 400 mg (241.3 mg magnesium) tablet Take 1 tablet (400 mg total) by mouth once daily. 90 tablet 3    potassium citrate (UROCIT-K) 10 mEq (1,080 mg) TbSR Take 2 tablets (20 mEq total) by mouth 2 (two) times daily with meals. 360 tablet 3    QUEtiapine (SEROQUEL) 100 MG Tab Take 100 mg by mouth once daily.      zolpidem (AMBIEN) 5 MG Tab        No facility-administered encounter medications on file as of 10/21/2024.     Review of Systems   General ROS: GENERAL:  No weight gain or loss  SKIN:  No rashes or lacerations  HEAD:  No headaches  EYES:  No exophthalmos, jaundice or blindness  EARS:  No dizziness, tinnitus or hearing loss  NOSE:  No changes in  "smell  MOUTH & THROAT:  No dyskinetic movements or obvious goiter  CHEST:  No shortness of breath, hyperventilation or cough  CARDIOVASCULAR:  No tachycardia or chest pain  ABDOMEN:  No nausea, vomiting, pain, constipation or diarrhea  URINARY:  No frequency, dysuria or sexual dysfunction  ENDOCRINE:  No polydipsia, polyuria  MUSCULOSKELETAL:  No pain or stiffness of the joints  NEUROLOGIC:  No weakness, sensory changes, seizures, confusion, memory loss, tremor or other abnormal movements  Physical Exam     Vitals:    10/21/24 1402   BP: 135/85   Pulse: 90       Physical Examination:   General appearance - alert, well appearing, and in no distress  Mental status - alert, oriented to person, place, and time  Eyes - pupils equal and reactive, extraocular eye movements intact  Ears - bilateral TM's and external ear canals normal  Nose - normal and patent, no erythema, discharge or polyps  Mouth - mucous membranes moist, pharynx normal without lesions  Neck - supple, no significant adenopathy  Chest - clear to auscultation, no wheezes, rales or rhonchi, symmetric air entry  Breast- no mass or lumps or tenderness  Heart - normal rate, regular rhythm, normal S1, S2, no murmurs, rubs, clicks or gallops  Abdomen - soft, nontender, nondistended, no masses or organomegaly of liver and spleen, no hernia noted.    Back exam - full range of motion, no tenderness, palpable spasm or pain on motion  LE - No edema noted.  Neurological - alert, oriented, normal speech, no focal findings or movement disorder noted  Musculoskeletal - no joint tenderness, deformity or swelling    LABS:  Lab Results   Component Value Date    CREATININE 0.7 02/08/2021    CREATININE 0.7 03/03/2020    CREATININE 0.7 08/18/2015     No results found for: "LABURIN"  Radiology:  CT 12/19/16   Punctate bilateral nonobstructing renal stones.    CT on 3/3/20  Decreased stone burden relative to the prior exam of 12/19/2016, with only a solitary punctate " nonobstructing stone in the left upper pole renal collecting system.  No evidence of obstructive uropathy.  Prior cholecystectomy and hysterectomy.  Prominent left ovary measuring up to 6.5 cm.  This likely relates to underlying follicles or cyst.  Clinical correlation advised with pelvic ultrasound if warranted.    Procedure: checking InterStim therapy  0.9 volts  Battery life greater 14 months   Impendence check all good.    Assessment and Plan:  April was seen today for bladder pain.    Diagnoses and all orders for this visit:    Pelvic floor dysfunction in female    Urinary urgency        Since she worked thru physical therapy for pelvic floor dysfunction, she has experienced tremendous improvement.  With this improvement, she was marla to control her bladder better as well.  She has not turned on Axonics therapy, because she felt more thumbing in the rectal area affecting her BM.    Will have her see [employer] rep to give her different stimulation, such as more vaginal stimulation or even cycling mode.    I spent 25 minutes with the patient of which more than half was spent in direct consultation with the patient in regards to our treatment and plan.    Follow-up:  Follow up in about 10 days (around 10/31/2024), or [employer] rep.

## (undated) DEVICE — ELECTRODE REM PLYHSV RETURN 9

## (undated) DEVICE — COVERS PROBE NR-48 STERILE

## (undated) DEVICE — NDL 18GA X1 1/2 REG BEVEL

## (undated) DEVICE — KIT PROBE COVER WITH GEL

## (undated) DEVICE — SUT 2-0 12-18IN SILK

## (undated) DEVICE — TRAY MINOR GEN SURG

## (undated) DEVICE — SYR 10CC LUER LOCK

## (undated) DEVICE — DRAPE ABDOMINAL TIBURON 14X11

## (undated) DEVICE — CUP MEDICINE STERILE 2OZ

## (undated) DEVICE — REMOTE AXONICS PATIENT

## (undated) DEVICE — ADHESIVE MASTISOL VIAL 48/BX

## (undated) DEVICE — SYS AXONICS CHARGING

## (undated) DEVICE — DRESSING TRANS 4X4 TEGADERM

## (undated) DEVICE — SEE MEDLINE ITEM 157131

## (undated) DEVICE — SEE MEDLINE ITEM 146417

## (undated) DEVICE — SUT MCRYL PLUS 4-0 PS2 27IN

## (undated) DEVICE — SOL WATER STRL IRR 1000ML

## (undated) DEVICE — ADHESIVE DERMABOND ADVANCED

## (undated) DEVICE — DRAPE C ARM 42 X 120 10/BX

## (undated) DEVICE — DRESSING TRANS 2X2 TEGADERM

## (undated) DEVICE — DRESSING TELFA STRL 4X3 LF

## (undated) DEVICE — SUT 2-0 VICRYL / SH (J417)

## (undated) DEVICE — DRAPE C-ARMOR EQUIPMENT COVER

## (undated) DEVICE — NDL HYPO REG 25G X 1 1/2